# Patient Record
Sex: MALE | Race: OTHER | NOT HISPANIC OR LATINO | URBAN - METROPOLITAN AREA
[De-identification: names, ages, dates, MRNs, and addresses within clinical notes are randomized per-mention and may not be internally consistent; named-entity substitution may affect disease eponyms.]

---

## 2017-09-12 ENCOUNTER — INPATIENT (INPATIENT)
Facility: HOSPITAL | Age: 67
LOS: 30 days | Discharge: HOME | DRG: 683 | End: 2017-10-13
Attending: INTERNAL MEDICINE | Admitting: INTERNAL MEDICINE
Payer: SELF-PAY

## 2017-09-12 VITALS
DIASTOLIC BLOOD PRESSURE: 83 MMHG | OXYGEN SATURATION: 97 % | SYSTOLIC BLOOD PRESSURE: 133 MMHG | HEART RATE: 100 BPM | RESPIRATION RATE: 18 BRPM | TEMPERATURE: 98 F

## 2017-09-12 DIAGNOSIS — N17.9 ACUTE KIDNEY FAILURE, UNSPECIFIED: ICD-10-CM

## 2017-09-12 DIAGNOSIS — Z29.9 ENCOUNTER FOR PROPHYLACTIC MEASURES, UNSPECIFIED: ICD-10-CM

## 2017-09-12 DIAGNOSIS — R41.82 ALTERED MENTAL STATUS, UNSPECIFIED: ICD-10-CM

## 2017-09-12 LAB
ACETONE SERPL-MCNC: NEGATIVE — SIGNIFICANT CHANGE UP
ALBUMIN SERPL ELPH-MCNC: 3.8 G/DL — SIGNIFICANT CHANGE UP (ref 3.5–5)
ALP SERPL-CCNC: 93 U/L — SIGNIFICANT CHANGE UP (ref 40–120)
ALT FLD-CCNC: 35 U/L DA — SIGNIFICANT CHANGE UP (ref 10–60)
ANION GAP SERPL CALC-SCNC: 9 MMOL/L — SIGNIFICANT CHANGE UP (ref 5–17)
APPEARANCE UR: CLEAR — SIGNIFICANT CHANGE UP
APTT BLD: 32.1 SEC — SIGNIFICANT CHANGE UP (ref 27.5–37.4)
AST SERPL-CCNC: 22 U/L — SIGNIFICANT CHANGE UP (ref 10–40)
BACTERIA # UR AUTO: ABNORMAL /HPF
BASOPHILS # BLD AUTO: 0.1 K/UL — SIGNIFICANT CHANGE UP (ref 0–0.2)
BASOPHILS NFR BLD AUTO: 0.7 % — SIGNIFICANT CHANGE UP (ref 0–2)
BILIRUB SERPL-MCNC: 1.1 MG/DL — SIGNIFICANT CHANGE UP (ref 0.2–1.2)
BILIRUB UR-MCNC: NEGATIVE — SIGNIFICANT CHANGE UP
BUN SERPL-MCNC: 25 MG/DL — HIGH (ref 7–18)
CALCIUM SERPL-MCNC: 9.7 MG/DL — SIGNIFICANT CHANGE UP (ref 8.4–10.5)
CHLORIDE SERPL-SCNC: 108 MMOL/L — SIGNIFICANT CHANGE UP (ref 96–108)
CHLORIDE UR-SCNC: 36 MMOL/L — LOW (ref 55–125)
CK MB BLD-MCNC: 3.2 % — SIGNIFICANT CHANGE UP (ref 0–3.5)
CK MB CFR SERPL CALC: 4.9 NG/ML — HIGH (ref 0–3.6)
CK SERPL-CCNC: 151 U/L — SIGNIFICANT CHANGE UP (ref 35–232)
CO2 SERPL-SCNC: 27 MMOL/L — SIGNIFICANT CHANGE UP (ref 22–31)
COLOR SPEC: YELLOW — SIGNIFICANT CHANGE UP
COMMENT - URINE: SIGNIFICANT CHANGE UP
CREAT ?TM UR-MCNC: 80 MG/DL — SIGNIFICANT CHANGE UP
CREAT SERPL-MCNC: 1.54 MG/DL — HIGH (ref 0.5–1.3)
DIFF PNL FLD: NEGATIVE — SIGNIFICANT CHANGE UP
EOSINOPHIL # BLD AUTO: 0 K/UL — SIGNIFICANT CHANGE UP (ref 0–0.5)
EOSINOPHIL NFR BLD AUTO: 0.4 % — SIGNIFICANT CHANGE UP (ref 0–6)
EPI CELLS # UR: SIGNIFICANT CHANGE UP
ETHANOL SERPL-MCNC: <3 MG/DL — SIGNIFICANT CHANGE UP (ref 0–10)
GLUCOSE SERPL-MCNC: 107 MG/DL — HIGH (ref 70–99)
GLUCOSE UR QL: NEGATIVE — SIGNIFICANT CHANGE UP
HCT VFR BLD CALC: 45.1 % — SIGNIFICANT CHANGE UP (ref 39–50)
HGB BLD-MCNC: 15.1 G/DL — SIGNIFICANT CHANGE UP (ref 13–17)
INR BLD: 1.21 RATIO — HIGH (ref 0.88–1.16)
KETONES UR-MCNC: ABNORMAL
LACTATE SERPL-SCNC: 1.9 MMOL/L — SIGNIFICANT CHANGE UP (ref 0.7–2)
LEUKOCYTE ESTERASE UR-ACNC: ABNORMAL
LIDOCAIN IGE QN: 120 U/L — SIGNIFICANT CHANGE UP (ref 73–393)
LYMPHOCYTES # BLD AUTO: 16.6 % — SIGNIFICANT CHANGE UP (ref 13–44)
LYMPHOCYTES # BLD AUTO: 2.1 K/UL — SIGNIFICANT CHANGE UP (ref 1–3.3)
MAGNESIUM SERPL-MCNC: 2.3 MG/DL — SIGNIFICANT CHANGE UP (ref 1.6–2.6)
MCHC RBC-ENTMCNC: 30 PG — SIGNIFICANT CHANGE UP (ref 27–34)
MCHC RBC-ENTMCNC: 33.4 GM/DL — SIGNIFICANT CHANGE UP (ref 32–36)
MCV RBC AUTO: 89.7 FL — SIGNIFICANT CHANGE UP (ref 80–100)
MONOCYTES # BLD AUTO: 1 K/UL — HIGH (ref 0–0.9)
MONOCYTES NFR BLD AUTO: 7.9 % — SIGNIFICANT CHANGE UP (ref 2–14)
NEUTROPHILS # BLD AUTO: 9.6 K/UL — HIGH (ref 1.8–7.4)
NEUTROPHILS NFR BLD AUTO: 74.4 % — SIGNIFICANT CHANGE UP (ref 43–77)
NITRITE UR-MCNC: NEGATIVE — SIGNIFICANT CHANGE UP
OSMOLALITY UR: 403 MOS/KG — SIGNIFICANT CHANGE UP (ref 50–1200)
PCP SPEC-MCNC: SIGNIFICANT CHANGE UP
PH UR: 5 — SIGNIFICANT CHANGE UP (ref 5–8)
PLATELET # BLD AUTO: 267 K/UL — SIGNIFICANT CHANGE UP (ref 150–400)
POTASSIUM SERPL-MCNC: 4.2 MMOL/L — SIGNIFICANT CHANGE UP (ref 3.5–5.3)
POTASSIUM SERPL-SCNC: 4.2 MMOL/L — SIGNIFICANT CHANGE UP (ref 3.5–5.3)
POTASSIUM UR-SCNC: 15 MMOL/L — LOW (ref 25–125)
PROT ?TM UR-MCNC: 7 MG/DL — SIGNIFICANT CHANGE UP (ref 0–12)
PROT SERPL-MCNC: 8 G/DL — SIGNIFICANT CHANGE UP (ref 6–8.3)
PROT UR-MCNC: 30 MG/DL
PROTHROM AB SERPL-ACNC: 13.2 SEC — HIGH (ref 9.8–12.7)
RBC # BLD: 5.02 M/UL — SIGNIFICANT CHANGE UP (ref 4.2–5.8)
RBC # FLD: 12.7 % — SIGNIFICANT CHANGE UP (ref 10.3–14.5)
RBC CASTS # UR COMP ASSIST: SIGNIFICANT CHANGE UP /HPF (ref 0–2)
SODIUM SERPL-SCNC: 144 MMOL/L — SIGNIFICANT CHANGE UP (ref 135–145)
SODIUM UR-SCNC: 48 MMOL/L — SIGNIFICANT CHANGE UP (ref 40–220)
SP GR SPEC: 1.02 — SIGNIFICANT CHANGE UP (ref 1.01–1.02)
TROPONIN I SERPL-MCNC: 0.02 NG/ML — SIGNIFICANT CHANGE UP (ref 0–0.04)
UROBILINOGEN FLD QL: NEGATIVE — SIGNIFICANT CHANGE UP
WBC # BLD: 12.8 K/UL — HIGH (ref 3.8–10.5)
WBC # FLD AUTO: 12.8 K/UL — HIGH (ref 3.8–10.5)
WBC UR QL: ABNORMAL /HPF (ref 0–5)

## 2017-09-12 PROCEDURE — 99285 EMERGENCY DEPT VISIT HI MDM: CPT

## 2017-09-12 PROCEDURE — 70450 CT HEAD/BRAIN W/O DYE: CPT | Mod: 26

## 2017-09-12 PROCEDURE — 74176 CT ABD & PELVIS W/O CONTRAST: CPT | Mod: 26

## 2017-09-12 PROCEDURE — 71010: CPT | Mod: 26

## 2017-09-12 RX ORDER — SODIUM CHLORIDE 9 MG/ML
1000 INJECTION INTRAMUSCULAR; INTRAVENOUS; SUBCUTANEOUS
Qty: 0 | Refills: 0 | Status: DISCONTINUED | OUTPATIENT
Start: 2017-09-12 | End: 2017-09-13

## 2017-09-12 RX ORDER — CEFTRIAXONE 500 MG/1
INJECTION, POWDER, FOR SOLUTION INTRAMUSCULAR; INTRAVENOUS
Qty: 0 | Refills: 0 | Status: DISCONTINUED | OUTPATIENT
Start: 2017-09-12 | End: 2017-09-14

## 2017-09-12 RX ORDER — SODIUM CHLORIDE 9 MG/ML
1000 INJECTION INTRAMUSCULAR; INTRAVENOUS; SUBCUTANEOUS
Qty: 0 | Refills: 0 | Status: DISCONTINUED | OUTPATIENT
Start: 2017-09-12 | End: 2017-09-18

## 2017-09-12 RX ORDER — HEPARIN SODIUM 5000 [USP'U]/ML
5000 INJECTION INTRAVENOUS; SUBCUTANEOUS EVERY 8 HOURS
Qty: 0 | Refills: 0 | Status: DISCONTINUED | OUTPATIENT
Start: 2017-09-12 | End: 2017-10-06

## 2017-09-12 RX ORDER — CEFTRIAXONE 500 MG/1
1 INJECTION, POWDER, FOR SOLUTION INTRAMUSCULAR; INTRAVENOUS ONCE
Qty: 0 | Refills: 0 | Status: COMPLETED | OUTPATIENT
Start: 2017-09-12 | End: 2017-09-12

## 2017-09-12 RX ORDER — SODIUM CHLORIDE 9 MG/ML
3 INJECTION INTRAMUSCULAR; INTRAVENOUS; SUBCUTANEOUS ONCE
Qty: 0 | Refills: 0 | Status: COMPLETED | OUTPATIENT
Start: 2017-09-12 | End: 2017-09-12

## 2017-09-12 RX ORDER — CEFTRIAXONE 500 MG/1
1 INJECTION, POWDER, FOR SOLUTION INTRAMUSCULAR; INTRAVENOUS EVERY 24 HOURS
Qty: 0 | Refills: 0 | Status: DISCONTINUED | OUTPATIENT
Start: 2017-09-13 | End: 2017-09-14

## 2017-09-12 RX ADMIN — SODIUM CHLORIDE 3 MILLILITER(S): 9 INJECTION INTRAMUSCULAR; INTRAVENOUS; SUBCUTANEOUS at 15:17

## 2017-09-12 RX ADMIN — SODIUM CHLORIDE 125 MILLILITER(S): 9 INJECTION INTRAMUSCULAR; INTRAVENOUS; SUBCUTANEOUS at 22:21

## 2017-09-12 RX ADMIN — SODIUM CHLORIDE 125 MILLILITER(S): 9 INJECTION INTRAMUSCULAR; INTRAVENOUS; SUBCUTANEOUS at 17:26

## 2017-09-12 NOTE — ED PROVIDER NOTE - OBJECTIVE STATEMENT
HPI limited. Pt very confused, unable to answer questions even with . 65 y/o male with unknown PMHx BIB EMS to the ED c/o abdominal pain as per triage note. Pt knows his name, but nothing else. Pt is homeless and was found on the street by EMS. Per triage nurse, pt denies nausea, vomiting.

## 2017-09-12 NOTE — H&P ADULT - RS GEN PE MLT RESP DETAILS PC
normal/breath sounds equal/clear to auscultation bilaterally/good air movement/respirations non-labored/airway patent

## 2017-09-12 NOTE — H&P ADULT - NEUROLOGICAL DETAILS
cranial nerves intact/responds to verbal commands/responds to pain/sensation intact/deep reflexes intact

## 2017-09-12 NOTE — ED PROVIDER NOTE - MEDICAL DECISION MAKING DETAILS
Pt brought in by ambulance. Upon triage with c/o LLQ pain. However, pt denied any abdominal pain during examination, and was more concerned about his BP. No knowledge of pt's underlying mentation. Will need workup, head CT, labs, r/o infectious process vs. metabolic imbalance vs. neurological abnormality and possible admission.

## 2017-09-12 NOTE — H&P ADULT - ASSESSMENT
Patient is a 66/M with unknown PMHx, BIB EMS for complaint of LLQ pain    patient got a CT head in the ED showing a  shunt but no hydrocephalus, CT A/P showing nodular liver suspicious for cirrhosis, VQ shunt courses over LLQ, upto upper pelvis. Found to have a UA showing 6-10 WBC with positive Leuc esterase, , Lipase, Lactate and CK WNL.. Has leucocytosis on WBC, 12.8, without left shift, VICENTE, BUN:Creat of 25:1.54  Admitted fro acute encephalopathy, under investigation

## 2017-09-12 NOTE — H&P ADULT - HISTORY OF PRESENT ILLNESS
Patient is a 66/M with unknown PMHx, BIB EMS for complaint of LLQ pain, however Patient is very confused, unable to answer questions even with . Used  services: [id number], Mr Cordoba. Majority of Hx obtained from ED provider note. patient is alert but not ortiented to time/place and person.Pt knows his name, but nothing else. Apparently Patient is homeless and was found on the street by EMS. Per triage nurse, pt denies nausea, vomiting.However, pt denied any abdominal pain during examination, and was more concerned about his BP. No knowledge of pt's underlying mentation. Patient's states that  he live with his brother in law in the [place], has h/o hemorrhagic stroke in the past, no h/o seizure, does not know his brother in law's last name, only 1st name, Ivan. does not know the number.   patient got a CT head in the ED showing a  shunt but no hydrocephalus, CT A/P showing nodular liver suspicious for cirrhosis, VQ shunt courses over LLQ, upto upper pelvis. Found to have a UA showing 6-10 WBC with positive Leuc esterase, , Lipase, Lactate and CK WNL.. Has leucocytosis on WBC, 12.8, without left shift, VICENTE, BUN:Creat of 25:1.54  Admitted fro acute encephalopathy, under investigation Patient is a 66/M with unknown PMHx, BIB EMS for complaint of LLQ pain, however Patient is very confused, unable to answer questions even with . Used  services: [id number], Mr Cordoba. Majority of Hx obtained from ED provider note. patient is alert but not ortiented to time/place and person.Pt knows his name, but nothing else. Apparently Patient is homeless and was found on the street by EMS. Per triage nurse, pt denies nausea, vomiting.However, pt denied any abdominal pain during examination, and was more concerned about his BP. No knowledge of pt's underlying mentation. Patient's states that  he live with his brother in law in the [place], has h/o hemorrhagic stroke in the past, no h/o seizure, does not know his brother in law's last name, only 1st name, Ivan. does not know the number.

## 2017-09-12 NOTE — H&P ADULT - PROBLEM SELECTOR PLAN 2
VICENTE, BUN:Creat of 25:1.54, ratio: 16.23  Unknown baseline  Will give IV hydration, send urine lytes  f/up BMP in AM

## 2017-09-12 NOTE — H&P ADULT - PROBLEM SELECTOR PLAN 1
Patient is awake and alert but confused, unable to provide any info except his name  unable to obtain the name of his PCP, pharmacy, any PMHx, unknown indiaction of the  shunt  has leucocytosis with positive UA, cultures testing, will start IVF and start ceftriaxone ans AMS could be 2/2 to underlying infection  qSOFA:1, Cultures testing, Lactate WNL  Will get Social Work consult to obtain more info  Primary team to please obtain records for the  Shunt  Patient currently does not have LLQ pain, CT A/P shows  shunt but no acute pathologies. Nodular liver, LFTs WNL  Will send hepatitis panel  Check Utox and Blood alcohol level Patient is awake and alert but confused, unable to provide any info except his name  unable to obtain the name of his PCP, pharmacy, any PMHx, unknown indication of the  shunt  has leucocytosis with positive UA, cultures testing, will start IVF and start ceftriaxone ans AMS could be 2/2 to underlying infection  qSOFA:1, Cultures testing, Lactate WNL  Will get Social Work consult to obtain more info  Primary team to please obtain records for the  Shunt, any PMHx, medication list and complete Med rec  Patient currently does not have LLQ pain, CT A/P shows  shunt but no acute pathologies. Nodular liver, LFTs WNL  Will send hepatitis panel  Check Utox and Blood alcohol level

## 2017-09-12 NOTE — ED PROVIDER NOTE - PROGRESS NOTE DETAILS
CT head & A/P -neg, pt now knows his name, claims he live with his brother in law in the [place], has h/o hemorrhagic stroke in the past, no h/o seizure, does not know his brother in law's last name, only 1st name, Ivan. does not know the number.  Since pt is confused, not sure if earlier episode was a seizure, d/w Dr. Martin, will admit

## 2017-09-12 NOTE — ED PROVIDER NOTE - UNABLE TO OBTAIN
HPI limited. Pt very confused, unable to answer questions even with . Dementia ROS limited. Pt is very confused. Unable to answer questions even with a .

## 2017-09-12 NOTE — ED ADULT NURSE REASSESSMENT NOTE - NS ED NURSE REASSESS COMMENT FT1
patient received lying supine in bed alert and oriented x3 breathing spontaneously on room .IV infusion in progress flowing at prescribed rate. Reported no complaints of pain or discomfort. Vitals charted.

## 2017-09-13 DIAGNOSIS — N39.0 URINARY TRACT INFECTION, SITE NOT SPECIFIED: ICD-10-CM

## 2017-09-13 LAB
24R-OH-CALCIDIOL SERPL-MCNC: 17.3 NG/ML — LOW (ref 30–100)
ALBUMIN SERPL ELPH-MCNC: 3.1 G/DL — LOW (ref 3.5–5)
ALP SERPL-CCNC: 76 U/L — SIGNIFICANT CHANGE UP (ref 40–120)
ALT FLD-CCNC: 26 U/L DA — SIGNIFICANT CHANGE UP (ref 10–60)
ANION GAP SERPL CALC-SCNC: 7 MMOL/L — SIGNIFICANT CHANGE UP (ref 5–17)
AST SERPL-CCNC: 21 U/L — SIGNIFICANT CHANGE UP (ref 10–40)
BASOPHILS # BLD AUTO: 0.1 K/UL — SIGNIFICANT CHANGE UP (ref 0–0.2)
BASOPHILS NFR BLD AUTO: 1.1 % — SIGNIFICANT CHANGE UP (ref 0–2)
BILIRUB SERPL-MCNC: 0.9 MG/DL — SIGNIFICANT CHANGE UP (ref 0.2–1.2)
BUN SERPL-MCNC: 15 MG/DL — SIGNIFICANT CHANGE UP (ref 7–18)
CALCIUM SERPL-MCNC: 8.7 MG/DL — SIGNIFICANT CHANGE UP (ref 8.4–10.5)
CHLORIDE SERPL-SCNC: 114 MMOL/L — HIGH (ref 96–108)
CHOLEST SERPL-MCNC: 102 MG/DL — SIGNIFICANT CHANGE UP (ref 10–199)
CO2 SERPL-SCNC: 26 MMOL/L — SIGNIFICANT CHANGE UP (ref 22–31)
CREAT SERPL-MCNC: 0.9 MG/DL — SIGNIFICANT CHANGE UP (ref 0.5–1.3)
EOSINOPHIL # BLD AUTO: 0.3 K/UL — SIGNIFICANT CHANGE UP (ref 0–0.5)
EOSINOPHIL NFR BLD AUTO: 3.1 % — SIGNIFICANT CHANGE UP (ref 0–6)
ETHANOL SERPL-MCNC: <3 MG/DL — SIGNIFICANT CHANGE UP (ref 0–10)
ETHANOL SERPL-MCNC: <3 MG/DL — SIGNIFICANT CHANGE UP (ref 0–10)
FOLATE SERPL-MCNC: 7.5 NG/ML — SIGNIFICANT CHANGE UP (ref 4.8–24.2)
GLUCOSE SERPL-MCNC: 90 MG/DL — SIGNIFICANT CHANGE UP (ref 70–99)
HAV IGM SER-ACNC: SIGNIFICANT CHANGE UP
HBA1C BLD-MCNC: 6.1 % — HIGH (ref 4–5.6)
HBV CORE IGM SER-ACNC: SIGNIFICANT CHANGE UP
HBV SURFACE AG SER-ACNC: SIGNIFICANT CHANGE UP
HCT VFR BLD CALC: 40.1 % — SIGNIFICANT CHANGE UP (ref 39–50)
HCV AB S/CO SERPL IA: 0.11 S/CO — SIGNIFICANT CHANGE UP
HCV AB SERPL-IMP: SIGNIFICANT CHANGE UP
HDLC SERPL-MCNC: 31 MG/DL — LOW (ref 40–125)
HGB BLD-MCNC: 13.6 G/DL — SIGNIFICANT CHANGE UP (ref 13–17)
HIV 1+2 AB+HIV1 P24 AG SERPL QL IA: SIGNIFICANT CHANGE UP
LIPID PNL WITH DIRECT LDL SERPL: 51 MG/DL — SIGNIFICANT CHANGE UP
LYMPHOCYTES # BLD AUTO: 2.5 K/UL — SIGNIFICANT CHANGE UP (ref 1–3.3)
LYMPHOCYTES # BLD AUTO: 24.9 % — SIGNIFICANT CHANGE UP (ref 13–44)
MAGNESIUM SERPL-MCNC: 2.3 MG/DL — SIGNIFICANT CHANGE UP (ref 1.6–2.6)
MCHC RBC-ENTMCNC: 29.9 PG — SIGNIFICANT CHANGE UP (ref 27–34)
MCHC RBC-ENTMCNC: 33.8 GM/DL — SIGNIFICANT CHANGE UP (ref 32–36)
MCV RBC AUTO: 88.3 FL — SIGNIFICANT CHANGE UP (ref 80–100)
MONOCYTES # BLD AUTO: 1.4 K/UL — HIGH (ref 0–0.9)
MONOCYTES NFR BLD AUTO: 13.6 % — SIGNIFICANT CHANGE UP (ref 2–14)
NEUTROPHILS # BLD AUTO: 5.8 K/UL — SIGNIFICANT CHANGE UP (ref 1.8–7.4)
NEUTROPHILS NFR BLD AUTO: 57.4 % — SIGNIFICANT CHANGE UP (ref 43–77)
PHOSPHATE SERPL-MCNC: 3 MG/DL — SIGNIFICANT CHANGE UP (ref 2.5–4.5)
PLATELET # BLD AUTO: 202 K/UL — SIGNIFICANT CHANGE UP (ref 150–400)
POTASSIUM SERPL-MCNC: 3.9 MMOL/L — SIGNIFICANT CHANGE UP (ref 3.5–5.3)
POTASSIUM SERPL-SCNC: 3.9 MMOL/L — SIGNIFICANT CHANGE UP (ref 3.5–5.3)
PROT SERPL-MCNC: 6.7 G/DL — SIGNIFICANT CHANGE UP (ref 6–8.3)
RBC # BLD: 4.55 M/UL — SIGNIFICANT CHANGE UP (ref 4.2–5.8)
RBC # FLD: 12.7 % — SIGNIFICANT CHANGE UP (ref 10.3–14.5)
SODIUM SERPL-SCNC: 147 MMOL/L — HIGH (ref 135–145)
TOTAL CHOLESTEROL/HDL RATIO MEASUREMENT: 3.3 RATIO — LOW (ref 3.4–9.6)
TRIGL SERPL-MCNC: 98 MG/DL — SIGNIFICANT CHANGE UP (ref 10–149)
TSH SERPL-MCNC: 1 UU/ML — SIGNIFICANT CHANGE UP (ref 0.34–4.82)
VIT B12 SERPL-MCNC: 403 PG/ML — SIGNIFICANT CHANGE UP (ref 243–894)
WBC # BLD: 10.1 K/UL — SIGNIFICANT CHANGE UP (ref 3.8–10.5)
WBC # FLD AUTO: 10.1 K/UL — SIGNIFICANT CHANGE UP (ref 3.8–10.5)

## 2017-09-13 RX ADMIN — SODIUM CHLORIDE 125 MILLILITER(S): 9 INJECTION INTRAMUSCULAR; INTRAVENOUS; SUBCUTANEOUS at 05:04

## 2017-09-13 RX ADMIN — HEPARIN SODIUM 5000 UNIT(S): 5000 INJECTION INTRAVENOUS; SUBCUTANEOUS at 16:07

## 2017-09-13 RX ADMIN — HEPARIN SODIUM 5000 UNIT(S): 5000 INJECTION INTRAVENOUS; SUBCUTANEOUS at 22:08

## 2017-09-13 RX ADMIN — CEFTRIAXONE 100 GRAM(S): 500 INJECTION, POWDER, FOR SOLUTION INTRAMUSCULAR; INTRAVENOUS at 22:05

## 2017-09-13 RX ADMIN — CEFTRIAXONE 100 GRAM(S): 500 INJECTION, POWDER, FOR SOLUTION INTRAMUSCULAR; INTRAVENOUS at 00:20

## 2017-09-13 RX ADMIN — HEPARIN SODIUM 5000 UNIT(S): 5000 INJECTION INTRAVENOUS; SUBCUTANEOUS at 05:03

## 2017-09-13 NOTE — PROGRESS NOTE ADULT - PROBLEM SELECTOR PLAN 2
AAOx2, otherwise mental status unreliable and inconsistent, CT negative for stroke  - SW consulted; possible plan for shelter vs long term placement  - Negative urine toxicology and blood alcohol levels  ***F/u PT recommendation  Patient is awake and alert but confused, unable to provide any info except his name  unable to obtain the name of his PCP, pharmacy, any PMHx, unknown indication of the  shunt  has leucocytosis with positive UA, cultures testing, will start IVF and start ceftriaxone ans AMS could be 2/2 to underlying infection  qSOFA:1, Cultures testing, Lactate WNL  Will get Social Work consult to obtain more info  Primary team to please obtain records for the  Shunt, any PMHx, medication list and complete Med rec  Patient currently does not have LLQ pain, CT A/P shows  shunt but no acute pathologies. Nodular liver, LFTs WNL  Will send hepatitis panel  Check Utox and Blood alcohol level AAOx2, otherwise mental status unreliable and inconsistent, CT negative for stroke  - SW consulted; possible plan for shelter vs long term placement  - Negative urine toxicology and blood alcohol levels  - Negative Hepatitis panel  ***F/u PT recommendation

## 2017-09-13 NOTE — PROGRESS NOTE ADULT - SUBJECTIVE AND OBJECTIVE BOX
PGY 1 Note discussed with supervising resident and primary attending    Patient is a 66y old  Male who presents with a chief complaint of LLQ pain (12 Sep 2017 22:05)      INTERVAL HPI/OVERNIGHT EVENTS: Patient seen and examined at bedside with no new complaints    MEDICATIONS  (STANDING):  sodium chloride 0.9%. 1000 milliLiter(s) (125 mL/Hr) IV Continuous <Continuous>  heparin  Injectable 5000 Unit(s) SubCutaneous every 8 hours  cefTRIAXone   IVPB      cefTRIAXone   IVPB 1 Gram(s) IV Intermittent every 24 hours    MEDICATIONS  (PRN):      __________________________________________________  REVIEW OF SYSTEMS:    CONSTITUTIONAL: No fever,   EYES: No acute visual disturbances  NECK: No pain or stiffness  RESPIRATORY: No cough; No shortness of breath  CARDIOVASCULAR: No chest pain, no palpitations  GASTROINTESTINAL: No pain. No nausea or vomiting; No diarrhea   NEUROLOGICAL: No headache or numbness, no tremors  MUSCULOSKELETAL: No joint pain, no muscle pain  GENITOURINARY: No dysuria, no frequency, no hesitancy  PSYCHIATRY: No depression , no anxiety  ALL OTHER  ROS negative        Vital Signs Last 24 Hrs  T(C): 37.1 (13 Sep 2017 14:22), Max: 37.4 (13 Sep 2017 01:35)  T(F): 98.7 (13 Sep 2017 14:22), Max: 99.4 (13 Sep 2017 01:35)  HR: 101 (13 Sep 2017 14:22) (65 - 101)  BP: 99/68 (13 Sep 2017 14:22) (99/68 - 143/54)  BP(mean): --  RR: 16 (13 Sep 2017 14:22) (16 - 18)  SpO2: 98% (13 Sep 2017 14:22) (95% - 100%)    ________________________________________________  PHYSICAL EXAM:  GENERAL: NAD  HEENT: Normocephalic;  conjunctivae and sclerae clear; moist mucous membranes;   NECK : supple  CHEST/LUNG: Clear to auscultation bilaterally with good air entry   HEART: S1 S2  regular; no murmurs, gallops or rubs  ABDOMEN: Soft, Nontender, Nondistended; Bowel sounds present  EXTREMITIES: No cyanosis; no edema; no calf tenderness  NERVOUS SYSTEM:  Awake and alert; Oriented  to place, person and time ; no new deficits    _________________________________________________  LABS:                        13.6   10.1  )-----------( 202      ( 13 Sep 2017 05:56 )             40.1     09-13    147<H>  |  114<H>  |  15  ----------------------------<  90  3.9   |  26  |  0.90    Ca    8.7      13 Sep 2017 05:56  Phos  3.0     -  Mg     2.3     -    TPro  6.7  /  Alb  3.1<L>  /  TBili  0.9  /  DBili  0.2  /  AST  21  /  ALT  26  /  AlkPhos  76  09-13    PT/INR - ( 12 Sep 2017 15:17 )   PT: 13.2 sec;   INR: 1.21 ratio         PTT - ( 12 Sep 2017 15:17 )  PTT:32.1 sec  Urinalysis Basic - ( 12 Sep 2017 19:25 )    Color: Yellow / Appearance: Clear / S.020 / pH: x  Gluc: x / Ketone: Small  / Bili: Negative / Urobili: Negative   Blood: x / Protein: 30 mg/dL / Nitrite: Negative   Leuk Esterase: Moderate / RBC: 0-2 /HPF / WBC 6-10 /HPF   Sq Epi: x / Non Sq Epi: Few / Bacteria: Few /HPF      CAPILLARY BLOOD GLUCOSE            RADIOLOGY & ADDITIONAL TESTS:    Imaging Personally Reviewed:  YES    Consultant(s) Notes Reviewed:   YES    Care Discussed with Consultants : YES    Plan of care was discussed with patient and /or primary care giver; all questions and concerns were addressed and care was aligned with patient's wishes.

## 2017-09-13 NOTE — PROGRESS NOTE ADULT - PROBLEM SELECTOR PLAN 1
UA w/ +ve LE and 6-10 WBC, no leukocytosis of fever overnight  - C/w Rocephin 1 gram  - UCx 9/12; E. faecalis, less than 50K  ID Dr. Fitzgerald

## 2017-09-14 LAB
-  AMIKACIN: SIGNIFICANT CHANGE UP
-  AMPICILLIN/SULBACTAM: SIGNIFICANT CHANGE UP
-  AMPICILLIN: SIGNIFICANT CHANGE UP
-  AMPICILLIN: SIGNIFICANT CHANGE UP
-  AZTREONAM: SIGNIFICANT CHANGE UP
-  CEFAZOLIN: SIGNIFICANT CHANGE UP
-  CEFEPIME: SIGNIFICANT CHANGE UP
-  CEFOXITIN: SIGNIFICANT CHANGE UP
-  CEFTAZIDIME: SIGNIFICANT CHANGE UP
-  CEFTRIAXONE: SIGNIFICANT CHANGE UP
-  CIPROFLOXACIN: SIGNIFICANT CHANGE UP
-  CIPROFLOXACIN: SIGNIFICANT CHANGE UP
-  ERTAPENEM: SIGNIFICANT CHANGE UP
-  GENTAMICIN: SIGNIFICANT CHANGE UP
-  IMIPENEM: SIGNIFICANT CHANGE UP
-  LEVOFLOXACIN: SIGNIFICANT CHANGE UP
-  MEROPENEM: SIGNIFICANT CHANGE UP
-  NITROFURANTOIN: SIGNIFICANT CHANGE UP
-  NITROFURANTOIN: SIGNIFICANT CHANGE UP
-  PIPERACILLIN/TAZOBACTAM: SIGNIFICANT CHANGE UP
-  TETRACYCLINE: SIGNIFICANT CHANGE UP
-  TOBRAMYCIN: SIGNIFICANT CHANGE UP
-  TRIMETHOPRIM/SULFAMETHOXAZOLE: SIGNIFICANT CHANGE UP
-  VANCOMYCIN: SIGNIFICANT CHANGE UP
ANION GAP SERPL CALC-SCNC: 6 MMOL/L — SIGNIFICANT CHANGE UP (ref 5–17)
BUN SERPL-MCNC: 10 MG/DL — SIGNIFICANT CHANGE UP (ref 7–18)
CALCIUM SERPL-MCNC: 8.4 MG/DL — SIGNIFICANT CHANGE UP (ref 8.4–10.5)
CHLORIDE SERPL-SCNC: 112 MMOL/L — HIGH (ref 96–108)
CO2 SERPL-SCNC: 27 MMOL/L — SIGNIFICANT CHANGE UP (ref 22–31)
CREAT SERPL-MCNC: 0.82 MG/DL — SIGNIFICANT CHANGE UP (ref 0.5–1.3)
CULTURE RESULTS: SIGNIFICANT CHANGE UP
GLUCOSE SERPL-MCNC: 89 MG/DL — SIGNIFICANT CHANGE UP (ref 70–99)
HCT VFR BLD CALC: 36.3 % — LOW (ref 39–50)
HGB BLD-MCNC: 12.2 G/DL — LOW (ref 13–17)
MCHC RBC-ENTMCNC: 29.9 PG — SIGNIFICANT CHANGE UP (ref 27–34)
MCHC RBC-ENTMCNC: 33.7 GM/DL — SIGNIFICANT CHANGE UP (ref 32–36)
MCV RBC AUTO: 88.7 FL — SIGNIFICANT CHANGE UP (ref 80–100)
METHOD TYPE: SIGNIFICANT CHANGE UP
METHOD TYPE: SIGNIFICANT CHANGE UP
ORGANISM # SPEC MICROSCOPIC CNT: SIGNIFICANT CHANGE UP
PLATELET # BLD AUTO: 186 K/UL — SIGNIFICANT CHANGE UP (ref 150–400)
POTASSIUM SERPL-MCNC: 3.5 MMOL/L — SIGNIFICANT CHANGE UP (ref 3.5–5.3)
POTASSIUM SERPL-SCNC: 3.5 MMOL/L — SIGNIFICANT CHANGE UP (ref 3.5–5.3)
RBC # BLD: 4.1 M/UL — LOW (ref 4.2–5.8)
RBC # FLD: 12.2 % — SIGNIFICANT CHANGE UP (ref 10.3–14.5)
SODIUM SERPL-SCNC: 145 MMOL/L — SIGNIFICANT CHANGE UP (ref 135–145)
SPECIMEN SOURCE: SIGNIFICANT CHANGE UP
WBC # BLD: 6.4 K/UL — SIGNIFICANT CHANGE UP (ref 3.8–10.5)
WBC # FLD AUTO: 6.4 K/UL — SIGNIFICANT CHANGE UP (ref 3.8–10.5)

## 2017-09-14 RX ORDER — ERGOCALCIFEROL 1.25 MG/1
50000 CAPSULE ORAL
Qty: 0 | Refills: 0 | Status: DISCONTINUED | OUTPATIENT
Start: 2017-09-14 | End: 2017-10-13

## 2017-09-14 RX ADMIN — HEPARIN SODIUM 5000 UNIT(S): 5000 INJECTION INTRAVENOUS; SUBCUTANEOUS at 14:35

## 2017-09-14 RX ADMIN — HEPARIN SODIUM 5000 UNIT(S): 5000 INJECTION INTRAVENOUS; SUBCUTANEOUS at 22:30

## 2017-09-14 RX ADMIN — SODIUM CHLORIDE 125 MILLILITER(S): 9 INJECTION INTRAMUSCULAR; INTRAVENOUS; SUBCUTANEOUS at 12:49

## 2017-09-14 RX ADMIN — HEPARIN SODIUM 5000 UNIT(S): 5000 INJECTION INTRAVENOUS; SUBCUTANEOUS at 05:20

## 2017-09-14 RX ADMIN — ERGOCALCIFEROL 50000 UNIT(S): 1.25 CAPSULE ORAL at 12:47

## 2017-09-14 NOTE — PROGRESS NOTE ADULT - PROBLEM SELECTOR PLAN 1
AAOx2, otherwise mental status unreliable and inconsistent, CT negative for stroke  - SW consulted; possible plan for shelter vs long term placement  - Negative urine toxicology and blood alcohol levels  - Negative Hepatitis panel  ***F/u PT recommendation; patient ambulatory w/ minimal assistance  ***F/u  for placement

## 2017-09-14 NOTE — CONSULT NOTE ADULT - SUBJECTIVE AND OBJECTIVE BOX
HPI:  Patient is a 66/M with unknown PMHx, BIB EMS for complaint of LLQ pain, however Patient is very confused, unable to answer questions even with . Used  services: [id number], Mr Cordoba. Majority of Hx obtained from ED provider note. patient is alert but not ortiented to time/place and person.Pt knows his name, but nothing else. Apparently Patient is homeless and was found on the street by EMS. Per triage nurse, pt denies nausea, vomiting.However, pt denied any abdominal pain during examination, and was more concerned about his BP. No knowledge of pt's underlying mentation. Patient's states that  he live with his brother in law in the [place], has h/o hemorrhagic stroke in the past, no h/o seizure, does not know his brother in law's last name, only 1st name, Ivan. does not know the number. (12 Sep 2017 22:05)      PAST MEDICAL & SURGICAL HISTORY:      No Known Allergies      Meds:  sodium chloride 0.9%. 1000 milliLiter(s) IV Continuous <Continuous>  heparin  Injectable 5000 Unit(s) SubCutaneous every 8 hours  cefTRIAXone   IVPB      cefTRIAXone   IVPB 1 Gram(s) IV Intermittent every 24 hours  ergocalciferol 92915 Unit(s) Oral <User Schedule>      SOCIAL HISTORY:  Smoker:  YES / NO        PACK YEARS:                         WHEN QUIT?  ETOH use:  YES / NO               FREQUENCY / QUANTITY:  Ilicit Drug use:  YES / NO  Occupation:  Assisted device use (Cane / Walker):  Live with:    FAMILY HISTORY:      VITALS:  Vital Signs Last 24 Hrs  T(C): 36.6 (14 Sep 2017 05:14), Max: 37.2 (13 Sep 2017 21:18)  T(F): 97.9 (14 Sep 2017 05:14), Max: 99 (13 Sep 2017 21:18)  HR: 71 (14 Sep 2017 05:14) (71 - 101)  BP: 138/74 (14 Sep 2017 05:14) (99/50 - 138/74)  BP(mean): --  RR: 18 (14 Sep 2017 05:14) (16 - 18)  SpO2: 96% (14 Sep 2017 05:14) (96% - 98%)    LABS/DIAGNOSTIC TESTS:                          12.2   6.4   )-----------( 186      ( 14 Sep 2017 07:34 )             36.3     WBC Count: 6.4 K/uL ( @ 07:34)  WBC Count: 10.1 K/uL ( @ 05:56)  WBC Count: 12.8 K/uL ( @ 15:17)          145  |  112<H>  |  10  ----------------------------<  89  3.5   |  27  |  0.82    Ca    8.4      14 Sep 2017 07:34  Phos  3.0       Mg     2.3         TPro  6.7  /  Alb  3.1<L>  /  TBili  0.9  /  DBili  0.2  /  AST  21  /  ALT  26  /  AlkPhos  76        Urinalysis Basic - ( 12 Sep 2017 19:25 )    Color: Yellow / Appearance: Clear / S.020 / pH: x  Gluc: x / Ketone: Small  / Bili: Negative / Urobili: Negative   Blood: x / Protein: 30 mg/dL / Nitrite: Negative   Leuk Esterase: Moderate / RBC: 0-2 /HPF / WBC 6-10 /HPF   Sq Epi: x / Non Sq Epi: Few / Bacteria: Few /HPF        LIVER FUNCTIONS - ( 13 Sep 2017 05:56 )  Alb: 3.1 g/dL / Pro: 6.7 g/dL / ALK PHOS: 76 U/L / ALT: 26 U/L DA / AST: 21 U/L / GGT: x             PT/INR - ( 12 Sep 2017 15:17 )   PT: 13.2 sec;   INR: 1.21 ratio         PTT - ( 12 Sep 2017 15:17 )  PTT:32.1 sec    LACTATE:    ABG -     CULTURES:       RADIOLOGY:      ROS  [  ] UNABLE TO ELICIT HPI:  Patient is a 66/M with unknown PMHx, BIB EMS for complaint of LLQ pain, however Patient is very confused, unable to answer questions even with . Used  services: [id number], Mr Cordoba. Majority of Hx obtained from ED provider note. patient is alert but not ortiented to time/place and person.Pt knows his name, but nothing else. Apparently Patient is homeless and was found on the street by EMS. Per triage nurse, pt denies nausea, vomiting.However, pt denied any abdominal pain during examination, and was more concerned about his BP. No knowledge of pt's underlying mentation. Patient's states that  he live with his brother in law in the [place], has h/o hemorrhagic stroke in the past, no h/o seizure, does not know his brother in law's last name, only 1st name, Ivan. does not know the number. (12 Sep 2017 22:05)      Brief Hospital course:      No Known Allergies      Meds:  sodium chloride 0.9%. 1000 milliLiter(s) IV Continuous <Continuous>  heparin  Injectable 5000 Unit(s) SubCutaneous every 8 hours  cefTRIAXone   IVPB      cefTRIAXone   IVPB 1 Gram(s) IV Intermittent every 24 hours  ergocalciferol 10044 Unit(s) Oral <User Schedule>      SOCIAL HISTORY:  Smoker:  YES / NO        PACK YEARS:                         WHEN QUIT?  ETOH use:  YES / NO               FREQUENCY / QUANTITY:  Ilicit Drug use:  YES / NO  Occupation:  Assisted device use (Cane / Walker):  Live with:    FAMILY HISTORY:      VITALS:  Vital Signs Last 24 Hrs  T(C): 36.6 (14 Sep 2017 05:14), Max: 37.2 (13 Sep 2017 21:18)  T(F): 97.9 (14 Sep 2017 05:14), Max: 99 (13 Sep 2017 21:18)  HR: 71 (14 Sep 2017 05:14) (71 - 101)  BP: 138/74 (14 Sep 2017 05:14) (99/50 - 138/74)  BP(mean): --  RR: 18 (14 Sep 2017 05:14) (16 - 18)  SpO2: 96% (14 Sep 2017 05:14) (96% - 98%)    LABS/DIAGNOSTIC TESTS:                          12.2   6.4   )-----------( 186      ( 14 Sep 2017 07:34 )             36.3     WBC Count: 6.4 K/uL ( @ 07:34)  WBC Count: 10.1 K/uL ( @ 05:56)  WBC Count: 12.8 K/uL ( @ 15:17)          145  |  112<H>  |  10  ----------------------------<  89  3.5   |  27  |  0.82    Ca    8.4      14 Sep 2017 07:34  Phos  3.0       Mg     2.3         TPro  6.7  /  Alb  3.1<L>  /  TBili  0.9  /  DBili  0.2  /  AST  21  /  ALT  26  /  AlkPhos  76        Urinalysis Basic - ( 12 Sep 2017 19:25 )    Color: Yellow / Appearance: Clear / S.020 / pH: x  Gluc: x / Ketone: Small  / Bili: Negative / Urobili: Negative   Blood: x / Protein: 30 mg/dL / Nitrite: Negative   Leuk Esterase: Moderate / RBC: 0-2 /HPF / WBC 6-10 /HPF   Sq Epi: x / Non Sq Epi: Few / Bacteria: Few /HPF        LIVER FUNCTIONS - ( 13 Sep 2017 05:56 )  Alb: 3.1 g/dL / Pro: 6.7 g/dL / ALK PHOS: 76 U/L / ALT: 26 U/L DA / AST: 21 U/L / GGT: x             PT/INR - ( 12 Sep 2017 15:17 )   PT: 13.2 sec;   INR: 1.21 ratio         PTT - ( 12 Sep 2017 15:17 )  PTT:32.1 sec    LACTATE:    ABG -     CULTURES:       RADIOLOGY:      ROS  [  ] UNABLE TO ELICIT HPI:  Patient is a 66/M with unknown PMHx, BIB EMS for complaint of LLQ pain, however Patient is very confused, unable to answer questions even with . Used  services: [id number], Mr Cordoba. Majority of Hx obtained from ED provider note. patient is alert but not ortiented to time/place and person.Pt knows his name, but nothing else. Apparently Patient is homeless and was found on the street by EMS. Per triage nurse, pt denies nausea, vomiting.However, pt denied any abdominal pain during examination, and was more concerned about his BP. No knowledge of pt's underlying mentation. Patient's states that  he live with his brother in law in the [place], has h/o hemorrhagic stroke in the past, no h/o seizure, does not know his brother in law's last name, only 1st name, Ivan. does not know the number. (12 Sep 2017 22:05)      Brief Hospital course:      No Known Allergies      Meds:  sodium chloride 0.9%. 1000 milliLiter(s) IV Continuous <Continuous>  heparin  Injectable 5000 Unit(s) SubCutaneous every 8 hours  cefTRIAXone   IVPB      cefTRIAXone   IVPB 1 Gram(s) IV Intermittent every 24 hours  ergocalciferol 33992 Unit(s) Oral <User Schedule>      SOCIAL HISTORY:  Smoker:  YES / NO        PACK YEARS:                         WHEN QUIT?  ETOH use:  YES / NO               FREQUENCY / QUANTITY:  Ilicit Drug use:  YES / NO  Occupation:  Assisted device use (Cane / Walker):  Live with:    FAMILY HISTORY:      VITALS:  Vital Signs Last 24 Hrs  T(C): 36.6 (14 Sep 2017 05:14), Max: 37.2 (13 Sep 2017 21:18)  T(F): 97.9 (14 Sep 2017 05:14), Max: 99 (13 Sep 2017 21:18)  HR: 71 (14 Sep 2017 05:14) (71 - 101)  BP: 138/74 (14 Sep 2017 05:14) (99/50 - 138/74)  BP(mean): --  RR: 18 (14 Sep 2017 05:14) (16 - 18)  SpO2: 96% (14 Sep 2017 05:14) (96% - 98%)    LABS/DIAGNOSTIC TESTS:                          12.2   6.4   )-----------( 186      ( 14 Sep 2017 07:34 )             36.3     WBC Count: 6.4 K/uL ( @ 07:34)  WBC Count: 10.1 K/uL ( @ 05:56)  WBC Count: 12.8 K/uL ( @ 15:17)          145  |  112<H>  |  10  ----------------------------<  89  3.5   |  27  |  0.82    Ca    8.4      14 Sep 2017 07:34  Phos  3.0       Mg     2.3         TPro  6.7  /  Alb  3.1<L>  /  TBili  0.9  /  DBili  0.2  /  AST  21  /  ALT  26  /  AlkPhos  76        Urinalysis Basic - ( 12 Sep 2017 19:25 )    Color: Yellow / Appearance: Clear / S.020 / pH: x  Gluc: x / Ketone: Small  / Bili: Negative / Urobili: Negative   Blood: x / Protein: 30 mg/dL / Nitrite: Negative   Leuk Esterase: Moderate / RBC: 0-2 /HPF / WBC 6-10 /HPF   Sq Epi: x / Non Sq Epi: Few / Bacteria: Few /HPF        LIVER FUNCTIONS - ( 13 Sep 2017 05:56 )  Alb: 3.1 g/dL / Pro: 6.7 g/dL / ALK PHOS: 76 U/L / ALT: 26 U/L DA / AST: 21 U/L / GGT: x             PT/INR - ( 12 Sep 2017 15:17 )   PT: 13.2 sec;   INR: 1.21 ratio         PTT - ( 12 Sep 2017 15:17 )  PTT:32.1 sec    LACTATE:    ABG -     CULTURES:       RADIOLOGY:  < from: Xray Chest 1 View AP/PA (17 @ 18:04) >    EXAM:  CHEST SINGLE AP OR PA                            PROCEDURE DATE:  2017          INTERPRETATION:  Views:1  Comparison: Unavailable at this time  History: Left lower quadrant pain    The lungs are clear of infiltrates and effusions.     The cardiac and   mediastinal contours appear unremarkable.  shunt tubing is noted.    Impression:  1. No evidence of acute pulmonary disease.        < from: CT Abdomen and Pelvis w/ Oral Cont (17 @ 16:48) >    EXAM:  CT ABDOMEN AND PELVIS OC                            PROCEDURE DATE:  2017          INTERPRETATION:  CT ABDOMEN AND PELVIS WITHOUT IV CONTRAST    CLINICAL STATEMENT: LLQ pain.    COMPARISON: None.    TECHNIQUE: CT scan of the abdomen and pelvis was performed without IV   contrast. Lack of IV contrast limits evaluation. Oral contrast was not   administered. Multiplanar reformations were made.    FINDINGS:  Bibasilar subsegmental atelectasis and a pleural effusion.    Mild nodularity of the liver surface; correlate for cirrhotic changes.   The unenhanced gallbladder, pancreas, spleen and adjacent glands are   grossly unremarkable.    No radiodense renal calculus. No hydroureteronephrosis. There is a   subcentimeter hypodensity left kidney upper pole which is too small to   characterize.    Evaluation of the GI tract is limited without oral contrast. GI tract is   unobstructed. Appendix is unremarkable. No free air or ascites.    Partially visualized left-sided retroperitoneal shunt catheter is seen in   the left anterior lower chest and abdominal subcutaneous soft tissues,   entering the peritoneal cavity in the upper abdomen, extending to the   left lower quadrant with tip terminating near the midline in the pelvis.    Atherosclerotic calcifications are present.    Urinary bladder contains no radiodense debris. Prostate is top normal in   size. Seminal vesicles are symmetric. No free pelvic fluid.    Degenerative changes in the spine. Small left and tiny right   fat-containing inguinal hernias.    IMPRESSION:  No noncontrast CT findings to explain the patient's left lower quadrant   pain.    It is noted that the  shunt courses via the left lower quadrant and   terminates in the upper pelvis in the midline; correlation for when the    shunt was placed is recommended.    Mild nodularity of the liver surface; correlate for cirrhotic changes.                ELVIA LINK M.D., ATTENDING RADIOLOGIST  This document has been electronically signed. Sep 12 2017  5:02PM                < end of copied text >    < from: CT Head No Cont (17 @ 16:11) >    EXAM:  CT BRAIN                            PROCEDURE DATE:  2017          INTERPRETATION:  CT HEAD WITHOUT CONTRAST    CLINICAL STATEMENT: abd pain, confusion.    COMPARISON: None available.    TECHNIQUE: Noncontrast axial CT head was obtained from the skull base to   vertex.    FINDINGS:  Left frontal approach ventriculoperitoneal shunt catheter tip terminates   in the third ventricle. No evidence of hydrocephalus. There is   hypodensity in the left frontal lobe which may be due to gliosis. An   embolization cortical mass is present in the region of the right MCA,   which causes streak artifact limiting evaluation.    There is no evidence of acute intracranial hemorrhage, mass effect or   midline shift. No CT evidence of acute large territory vascular infarct.   The ventricles and cortical sulci are prominent reflecting parenchymal   volume loss. Patchy hypodensities in the periventricular white matter are   nonspecific, but likely sequela of small vessel ischemic disease.   Intracranial atherosclerotic calcifications are present.    Chronic lacunar infarcts in the right thalamus and right basal ganglia.    The visualized paranasal sinuses are well aerated. Partial opacification   of the right inferior mastoid air cells.    IMPRESSION:  No acute intracranial hemorrhage, mass effect or midline shift.    Left-sided  shunt in place. No hydrocephalus.                ELVIA LINK M.D., ATTENDING RADIOLOGIST  This document has been electronically signed. Sep 12 2017  4:15PM                < end of copied text >                      LAUREEN SWEENEY M.D., ATTENDING RADIOLOGIST  This document has been electronically signed. Sep 12 2017  7:05PM                < end of copied text >        ROS  [  ] UNABLE TO ELICIT HPI:  Patient is a 66/M with unknown PMHx, BIB EMS for complaint of LLQ pain, however Patient is very confused, unable to answer questions even with . Used  services: [id number], Mr Cordoba. Majority of Hx obtained from ED provider note. patient is alert but not ortiented to time/place and person.Pt knows his name, but nothing else. Apparently Patient is homeless and was found on the street by EMS. Per triage nurse, pt denies nausea, vomiting.However, pt denied any abdominal pain during examination, and was more concerned about his BP. No knowledge of pt's underlying mentation. Patient's states that  he live with his brother in law in the [place], has h/o hemorrhagic stroke in the past, no h/o seizure, does not know his brother in law's last name, only 1st name, Ivan. does not know the number. (12 Sep 2017 22:05)      Brief Hospital course: Patient has been afebrile in the hospital, UA : weakly positive, Blood cultures negative, Urine culture growing GNR and E. Fecalis. Patient also has a  shunt. Patient is currently on IV rocephin      No Known Allergies      Meds:  sodium chloride 0.9%. 1000 milliLiter(s) IV Continuous <Continuous>  heparin  Injectable 5000 Unit(s) SubCutaneous every 8 hours  cefTRIAXone   IVPB      cefTRIAXone   IVPB 1 Gram(s) IV Intermittent every 24 hours  ergocalciferol 60278 Unit(s) Oral <User Schedule>      SOCIAL HISTORY:  Smoker:  YES / NO        PACK YEARS:                         WHEN QUIT?  ETOH use:  YES / NO               FREQUENCY / QUANTITY:  Ilicit Drug use:  YES / NO  Occupation:  Assisted device use (Cane / Walker):  Live with:    FAMILY HISTORY:      VITALS:  Vital Signs Last 24 Hrs  T(C): 36.6 (14 Sep 2017 05:14), Max: 37.2 (13 Sep 2017 21:18)  T(F): 97.9 (14 Sep 2017 05:14), Max: 99 (13 Sep 2017 21:18)  HR: 71 (14 Sep 2017 05:14) (71 - 101)  BP: 138/74 (14 Sep 2017 05:14) (99/50 - 138/74)  BP(mean): --  RR: 18 (14 Sep 2017 05:14) (16 - 18)  SpO2: 96% (14 Sep 2017 05:14) (96% - 98%)    LABS/DIAGNOSTIC TESTS:                          12.2   6.4   )-----------( 186      ( 14 Sep 2017 07:34 )             36.3     WBC Count: 6.4 K/uL ( @ 07:34)  WBC Count: 10.1 K/uL ( @ 05:56)  WBC Count: 12.8 K/uL ( @ 15:17)          145  |  112<H>  |  10  ----------------------------<  89  3.5   |  27  |  0.82    Ca    8.4      14 Sep 2017 07:34  Phos  3.0       Mg     2.3         TPro  6.7  /  Alb  3.1<L>  /  TBili  0.9  /  DBili  0.2  /  AST  21  /  ALT  26  /  AlkPhos  76  13      Urinalysis Basic - ( 12 Sep 2017 19:25 )    Color: Yellow / Appearance: Clear / S.020 / pH: x  Gluc: x / Ketone: Small  / Bili: Negative / Urobili: Negative   Blood: x / Protein: 30 mg/dL / Nitrite: Negative   Leuk Esterase: Moderate / RBC: 0-2 /HPF / WBC 6-10 /HPF   Sq Epi: x / Non Sq Epi: Few / Bacteria: Few /HPF        LIVER FUNCTIONS - ( 13 Sep 2017 05:56 )  Alb: 3.1 g/dL / Pro: 6.7 g/dL / ALK PHOS: 76 U/L / ALT: 26 U/L DA / AST: 21 U/L / GGT: x             PT/INR - ( 12 Sep 2017 15:17 )   PT: 13.2 sec;   INR: 1.21 ratio         PTT - ( 12 Sep 2017 15:17 )  PTT:32.1 sec    LACTATE:    ABG -     CULTURES:       RADIOLOGY:  < from: Xray Chest 1 View AP/PA (17 @ 18:04) >    EXAM:  CHEST SINGLE AP OR PA                            PROCEDURE DATE:  2017          INTERPRETATION:  Views:1  Comparison: Unavailable at this time  History: Left lower quadrant pain    The lungs are clear of infiltrates and effusions.     The cardiac and   mediastinal contours appear unremarkable.  shunt tubing is noted.    Impression:  1. No evidence of acute pulmonary disease.        < from: CT Abdomen and Pelvis w/ Oral Cont (17 @ 16:48) >    EXAM:  CT ABDOMEN AND PELVIS OC                            PROCEDURE DATE:  2017          INTERPRETATION:  CT ABDOMEN AND PELVIS WITHOUT IV CONTRAST    CLINICAL STATEMENT: LLQ pain.    COMPARISON: None.    TECHNIQUE: CT scan of the abdomen and pelvis was performed without IV   contrast. Lack of IV contrast limits evaluation. Oral contrast was not   administered. Multiplanar reformations were made.    FINDINGS:  Bibasilar subsegmental atelectasis and a pleural effusion.    Mild nodularity of the liver surface; correlate for cirrhotic changes.   The unenhanced gallbladder, pancreas, spleen and adjacent glands are   grossly unremarkable.    No radiodense renal calculus. No hydroureteronephrosis. There is a   subcentimeter hypodensity left kidney upper pole which is too small to   characterize.    Evaluation of the GI tract is limited without oral contrast. GI tract is   unobstructed. Appendix is unremarkable. No free air or ascites.    Partially visualized left-sided retroperitoneal shunt catheter is seen in   the left anterior lower chest and abdominal subcutaneous soft tissues,   entering the peritoneal cavity in the upper abdomen, extending to the   left lower quadrant with tip terminating near the midline in the pelvis.    Atherosclerotic calcifications are present.    Urinary bladder contains no radiodense debris. Prostate is top normal in   size. Seminal vesicles are symmetric. No free pelvic fluid.    Degenerative changes in the spine. Small left and tiny right   fat-containing inguinal hernias.    IMPRESSION:  No noncontrast CT findings to explain the patient's left lower quadrant   pain.    It is noted that the  shunt courses via the left lower quadrant and   terminates in the upper pelvis in the midline; correlation for when the    shunt was placed is recommended.    Mild nodularity of the liver surface; correlate for cirrhotic changes.                ELVIA LINK M.D., ATTENDING RADIOLOGIST  This document has been electronically signed. Sep 12 2017  5:02PM                < end of copied text >    < from: CT Head No Cont (17 @ 16:11) >    EXAM:  CT BRAIN                            PROCEDURE DATE:  2017          INTERPRETATION:  CT HEAD WITHOUT CONTRAST    CLINICAL STATEMENT: abd pain, confusion.    COMPARISON: None available.    TECHNIQUE: Noncontrast axial CT head was obtained from the skull base to   vertex.    FINDINGS:  Left frontal approach ventriculoperitoneal shunt catheter tip terminates   in the third ventricle. No evidence of hydrocephalus. There is   hypodensity in the left frontal lobe which may be due to gliosis. An   embolization cortical mass is present in the region of the right MCA,   which causes streak artifact limiting evaluation.    There is no evidence of acute intracranial hemorrhage, mass effect or   midline shift. No CT evidence of acute large territory vascular infarct.   The ventricles and cortical sulci are prominent reflecting parenchymal   volume loss. Patchy hypodensities in the periventricular white matter are   nonspecific, but likely sequela of small vessel ischemic disease.   Intracranial atherosclerotic calcifications are present.    Chronic lacunar infarcts in the right thalamus and right basal ganglia.    The visualized paranasal sinuses are well aerated. Partial opacification   of the right inferior mastoid air cells.    IMPRESSION:  No acute intracranial hemorrhage, mass effect or midline shift.    Left-sided  shunt in place. No hydrocephalus.                ELVIA LINK M.D., ATTENDING RADIOLOGIST  This document has been electronically signed. Sep 12 2017  4:15PM                < end of copied text >                      LAUREEN SWEENEY M.D., ATTENDING RADIOLOGIST  This document has been electronically signed. Sep 12 2017  7:05PM                < end of copied text >        ROS  [  ] UNABLE TO ELICIT HPI:  Patient is a 66/M with unknown PMHx, BIB EMS for complaint of LLQ pain, however Patient is very confused, unable to answer questions even with . Used  services: [id number], Mr Cordoba. Majority of Hx obtained from ED provider note. patient is alert but not ortiented to time/place and person.Pt knows his name, but nothing else. Apparently Patient is homeless and was found on the street by EMS. Per triage nurse, pt denies nausea, vomiting.However, pt denied any abdominal pain during examination, and was more concerned about his BP. No knowledge of pt's underlying mentation. Patient's states that  he live with his brother in law in the [place], has h/o hemorrhagic stroke in the past, no h/o seizure, does not know his brother in law's last name, only 1st name, Ivan. does not know the number. (12 Sep 2017 22:05)      Brief Hospital course: Patient has been afebrile in the hospital, UA : weakly positive, Blood cultures negative, Urine culture growing GNR and E. Fecalis. Patient also has a  shunt. Patient is currently on IV rocephin. Patient is AOx 1-2, and states that "he was in custodial for 60 days " and then was released. He has stroke about 8 years ago and this time his BP was high so he called EMS thinking he might have another stroke      No Known Allergies      Meds:  sodium chloride 0.9%. 1000 milliLiter(s) IV Continuous <Continuous>  heparin  Injectable 5000 Unit(s) SubCutaneous every 8 hours  cefTRIAXone   IVPB      cefTRIAXone   IVPB 1 Gram(s) IV Intermittent every 24 hours  ergocalciferol 99547 Unit(s) Oral <User Schedule>      SOCIAL HISTORY:  Smoker:  YES / NO        PACK YEARS:                         WHEN QUIT?  ETOH use:  YES / NO               FREQUENCY / QUANTITY:  Ilicit Drug use:  YES / NO  Occupation:  Assisted device use (Cane / Walker):  Live with:    FAMILY HISTORY:      VITALS:  Vital Signs Last 24 Hrs  T(C): 36.6 (14 Sep 2017 05:14), Max: 37.2 (13 Sep 2017 21:18)  T(F): 97.9 (14 Sep 2017 05:14), Max: 99 (13 Sep 2017 21:18)  HR: 71 (14 Sep 2017 05:14) (71 - 101)  BP: 138/74 (14 Sep 2017 05:14) (99/50 - 138/74)  BP(mean): --  RR: 18 (14 Sep 2017 05:14) (16 - 18)  SpO2: 96% (14 Sep 2017 05:14) (96% - 98%)    LABS/DIAGNOSTIC TESTS:                          12.2   6.4   )-----------( 186      ( 14 Sep 2017 07:34 )             36.3     WBC Count: 6.4 K/uL ( @ 07:34)  WBC Count: 10.1 K/uL ( @ 05:56)  WBC Count: 12.8 K/uL ( @ 15:17)          145  |  112<H>  |  10  ----------------------------<  89  3.5   |  27  |  0.82    Ca    8.4      14 Sep 2017 07:34  Phos  3.0       Mg     2.3         TPro  6.7  /  Alb  3.1<L>  /  TBili  0.9  /  DBili  0.2  /  AST  21  /  ALT  26  /  AlkPhos  76        Urinalysis Basic - ( 12 Sep 2017 19:25 )    Color: Yellow / Appearance: Clear / S.020 / pH: x  Gluc: x / Ketone: Small  / Bili: Negative / Urobili: Negative   Blood: x / Protein: 30 mg/dL / Nitrite: Negative   Leuk Esterase: Moderate / RBC: 0-2 /HPF / WBC 6-10 /HPF   Sq Epi: x / Non Sq Epi: Few / Bacteria: Few /HPF        LIVER FUNCTIONS - ( 13 Sep 2017 05:56 )  Alb: 3.1 g/dL / Pro: 6.7 g/dL / ALK PHOS: 76 U/L / ALT: 26 U/L DA / AST: 21 U/L / GGT: x             PT/INR - ( 12 Sep 2017 15:17 )   PT: 13.2 sec;   INR: 1.21 ratio         PTT - ( 12 Sep 2017 15:17 )  PTT:32.1 sec    LACTATE:    ABG -     CULTURES:       RADIOLOGY:  < from: Xray Chest 1 View AP/PA (17 @ 18:04) >    EXAM:  CHEST SINGLE AP OR PA                            PROCEDURE DATE:  2017          INTERPRETATION:  Views:1  Comparison: Unavailable at this time  History: Left lower quadrant pain    The lungs are clear of infiltrates and effusions.     The cardiac and   mediastinal contours appear unremarkable.  shunt tubing is noted.    Impression:  1. No evidence of acute pulmonary disease.        < from: CT Abdomen and Pelvis w/ Oral Cont (17 @ 16:48) >    EXAM:  CT ABDOMEN AND PELVIS OC                            PROCEDURE DATE:  2017          INTERPRETATION:  CT ABDOMEN AND PELVIS WITHOUT IV CONTRAST    CLINICAL STATEMENT: LLQ pain.    COMPARISON: None.    TECHNIQUE: CT scan of the abdomen and pelvis was performed without IV   contrast. Lack of IV contrast limits evaluation. Oral contrast was not   administered. Multiplanar reformations were made.    FINDINGS:  Bibasilar subsegmental atelectasis and a pleural effusion.    Mild nodularity of the liver surface; correlate for cirrhotic changes.   The unenhanced gallbladder, pancreas, spleen and adjacent glands are   grossly unremarkable.    No radiodense renal calculus. No hydroureteronephrosis. There is a   subcentimeter hypodensity left kidney upper pole which is too small to   characterize.    Evaluation of the GI tract is limited without oral contrast. GI tract is   unobstructed. Appendix is unremarkable. No free air or ascites.    Partially visualized left-sided retroperitoneal shunt catheter is seen in   the left anterior lower chest and abdominal subcutaneous soft tissues,   entering the peritoneal cavity in the upper abdomen, extending to the   left lower quadrant with tip terminating near the midline in the pelvis.    Atherosclerotic calcifications are present.    Urinary bladder contains no radiodense debris. Prostate is top normal in   size. Seminal vesicles are symmetric. No free pelvic fluid.    Degenerative changes in the spine. Small left and tiny right   fat-containing inguinal hernias.    IMPRESSION:  No noncontrast CT findings to explain the patient's left lower quadrant   pain.    It is noted that the  shunt courses via the left lower quadrant and   terminates in the upper pelvis in the midline; correlation for when the    shunt was placed is recommended.    Mild nodularity of the liver surface; correlate for cirrhotic changes.                ELVIA LINK M.D., ATTENDING RADIOLOGIST  This document has been electronically signed. Sep 12 2017  5:02PM                < end of copied text >    < from: CT Head No Cont (17 @ 16:11) >    EXAM:  CT BRAIN                            PROCEDURE DATE:  2017          INTERPRETATION:  CT HEAD WITHOUT CONTRAST    CLINICAL STATEMENT: abd pain, confusion.    COMPARISON: None available.    TECHNIQUE: Noncontrast axial CT head was obtained from the skull base to   vertex.    FINDINGS:  Left frontal approach ventriculoperitoneal shunt catheter tip terminates   in the third ventricle. No evidence of hydrocephalus. There is   hypodensity in the left frontal lobe which may be due to gliosis. An   embolization cortical mass is present in the region of the right MCA,   which causes streak artifact limiting evaluation.    There is no evidence of acute intracranial hemorrhage, mass effect or   midline shift. No CT evidence of acute large territory vascular infarct.   The ventricles and cortical sulci are prominent reflecting parenchymal   volume loss. Patchy hypodensities in the periventricular white matter are   nonspecific, but likely sequela of small vessel ischemic disease.   Intracranial atherosclerotic calcifications are present.    Chronic lacunar infarcts in the right thalamus and right basal ganglia.    The visualized paranasal sinuses are well aerated. Partial opacification   of the right inferior mastoid air cells.    IMPRESSION:  No acute intracranial hemorrhage, mass effect or midline shift.    Left-sided  shunt in place. No hydrocephalus.                ELVIA LINK M.D., ATTENDING RADIOLOGIST  This document has been electronically signed. Sep 12 2017  4:15PM                < end of copied text >                      LAUREEN SWEENEY M.D., ATTENDING RADIOLOGIST  This document has been electronically signed. Sep 12 2017  7:05PM                < end of copied text >        ROS  [ x ] UNABLE TO ELICIT: limited given patient's underlying medical condition HPI:  Patient is a 66/M with unknown PMHx, BIB EMS for complaint of LLQ pain, was found on the street by EMS. Per triage nurse, pt denies nausea, vomiting.However, pt denied any abdominal pain during examination, and was more concerned about his BP.  Patient's states that  he live with his brother in law in the [place], has h/o hemorrhagic stroke in the past, no h/o seizure, and has a h/o a  shunt after having had a hemorrhagic CVA. Spoke with pt via a [place] speaking doctor and he has no dysuria or cloudy urine, no fevers or chills.      No Known Allergies      Meds:  sodium chloride 0.9%. 1000 milliLiter(s) IV Continuous <Continuous>  heparin  Injectable 5000 Unit(s) SubCutaneous every 8 hours  cefTRIAXone   IVPB      cefTRIAXone   IVPB 1 Gram(s) IV Intermittent every 24 hours  ergocalciferol 35626 Unit(s) Oral <User Schedule>      SOCIAL HISTORY:  Smoker:  YES / NO        PACK YEARS:                         WHEN QUIT?  ETOH use:  YES / NO               FREQUENCY / QUANTITY:  Ilicit Drug use:  YES / NO  Occupation:  Assisted device use (Cane / Walker):  Live with:    FAMILY HISTORY:      VITALS:  Vital Signs Last 24 Hrs  T(C): 36.6 (14 Sep 2017 05:14), Max: 37.2 (13 Sep 2017 21:18)  T(F): 97.9 (14 Sep 2017 05:14), Max: 99 (13 Sep 2017 21:18)  HR: 71 (14 Sep 2017 05:14) (71 - 101)  BP: 138/74 (14 Sep 2017 05:14) (99/50 - 138/74)  BP(mean): --  RR: 18 (14 Sep 2017 05:14) (16 - 18)  SpO2: 96% (14 Sep 2017 05:14) (96% - 98%)    LABS/DIAGNOSTIC TESTS:                          12.2   6.4   )-----------( 186      ( 14 Sep 2017 07:34 )             36.3     WBC Count: 6.4 K/uL ( @ 07:34)  WBC Count: 10.1 K/uL ( @ 05:56)  WBC Count: 12.8 K/uL ( @ 15:17)          145  |  112<H>  |  10  ----------------------------<  89  3.5   |  27  |  0.82    Ca    8.4      14 Sep 2017 07:34  Phos  3.0       Mg     2.3         TPro  6.7  /  Alb  3.1<L>  /  TBili  0.9  /  DBili  0.2  /  AST  21  /  ALT  26  /  AlkPhos  76  -13      Urinalysis Basic - ( 12 Sep 2017 19:25 )    Color: Yellow / Appearance: Clear / S.020 / pH: x  Gluc: x / Ketone: Small  / Bili: Negative / Urobili: Negative   Blood: x / Protein: 30 mg/dL / Nitrite: Negative   Leuk Esterase: Moderate / RBC: 0-2 /HPF / WBC 6-10 /HPF   Sq Epi: x / Non Sq Epi: Few / Bacteria: Few /HPF        LIVER FUNCTIONS - ( 13 Sep 2017 05:56 )  Alb: 3.1 g/dL / Pro: 6.7 g/dL / ALK PHOS: 76 U/L / ALT: 26 U/L DA / AST: 21 U/L / GGT: x             PT/INR - ( 12 Sep 2017 15:17 )   PT: 13.2 sec;   INR: 1.21 ratio         PTT - ( 12 Sep 2017 15:17 )  PTT:32.1 sec    LACTATE:    ABG -     CULTURES:       RADIOLOGY:  < from: Xray Chest 1 View AP/PA (17 @ 18:04) >    EXAM:  CHEST SINGLE AP OR PA                            PROCEDURE DATE:  2017          INTERPRETATION:  Views:1  Comparison: Unavailable at this time  History: Left lower quadrant pain    The lungs are clear of infiltrates and effusions.     The cardiac and   mediastinal contours appear unremarkable.  shunt tubing is noted.    Impression:  1. No evidence of acute pulmonary disease.        < from: CT Abdomen and Pelvis w/ Oral Cont (17 @ 16:48) >    EXAM:  CT ABDOMEN AND PELVIS OC                            PROCEDURE DATE:  2017          INTERPRETATION:  CT ABDOMEN AND PELVIS WITHOUT IV CONTRAST    CLINICAL STATEMENT: LLQ pain.    COMPARISON: None.    TECHNIQUE: CT scan of the abdomen and pelvis was performed without IV   contrast. Lack of IV contrast limits evaluation. Oral contrast was not   administered. Multiplanar reformations were made.    FINDINGS:  Bibasilar subsegmental atelectasis and a pleural effusion.    Mild nodularity of the liver surface; correlate for cirrhotic changes.   The unenhanced gallbladder, pancreas, spleen and adjacent glands are   grossly unremarkable.    No radiodense renal calculus. No hydroureteronephrosis. There is a   subcentimeter hypodensity left kidney upper pole which is too small to   characterize.    Evaluation of the GI tract is limited without oral contrast. GI tract is   unobstructed. Appendix is unremarkable. No free air or ascites.    Partially visualized left-sided retroperitoneal shunt catheter is seen in   the left anterior lower chest and abdominal subcutaneous soft tissues,   entering the peritoneal cavity in the upper abdomen, extending to the   left lower quadrant with tip terminating near the midline in the pelvis.    Atherosclerotic calcifications are present.    Urinary bladder contains no radiodense debris. Prostate is top normal in   size. Seminal vesicles are symmetric. No free pelvic fluid.    Degenerative changes in the spine. Small left and tiny right   fat-containing inguinal hernias.    IMPRESSION:  No noncontrast CT findings to explain the patient's left lower quadrant   pain.    It is noted that the  shunt courses via the left lower quadrant and   terminates in the upper pelvis in the midline; correlation for when the    shunt was placed is recommended.    Mild nodularity of the liver surface; correlate for cirrhotic changes.                ELVIA LINK M.D., ATTENDING RADIOLOGIST  This document has been electronically signed. Sep 12 2017  5:02PM                < end of copied text >    < from: CT Head No Cont (17 @ 16:11) >    EXAM:  CT BRAIN                            PROCEDURE DATE:  2017          INTERPRETATION:  CT HEAD WITHOUT CONTRAST    CLINICAL STATEMENT: abd pain, confusion.    COMPARISON: None available.    TECHNIQUE: Noncontrast axial CT head was obtained from the skull base to   vertex.    FINDINGS:  Left frontal approach ventriculoperitoneal shunt catheter tip terminates   in the third ventricle. No evidence of hydrocephalus. There is   hypodensity in the left frontal lobe which may be due to gliosis. An   embolization cortical mass is present in the region of the right MCA,   which causes streak artifact limiting evaluation.    There is no evidence of acute intracranial hemorrhage, mass effect or   midline shift. No CT evidence of acute large territory vascular infarct.   The ventricles and cortical sulci are prominent reflecting parenchymal   volume loss. Patchy hypodensities in the periventricular white matter are   nonspecific, but likely sequela of small vessel ischemic disease.   Intracranial atherosclerotic calcifications are present.    Chronic lacunar infarcts in the right thalamus and right basal ganglia.    The visualized paranasal sinuses are well aerated. Partial opacification   of the right inferior mastoid air cells.    IMPRESSION:  No acute intracranial hemorrhage, mass effect or midline shift.    Left-sided  shunt in place. No hydrocephalus.                ELVIA LINK M.D., ATTENDING RADIOLOGIST  This document has been electronically signed. Sep 12 2017  4:15PM                < end of copied text >                      LAUREEN SWEENEY M.D., ATTENDING RADIOLOGIST  This document has been electronically signed. Sep 12 2017  7:05PM                < end of copied text >        ROS  [ ] UNABLE TO ELICIT: limited given patient's underlying medical condition

## 2017-09-14 NOTE — CONSULT NOTE ADULT - GASTROINTESTINAL DETAILS
no masses palpable/nontender/no distention/soft/normal/bowel sounds normal nontender/no rebound tenderness/no rigidity/normal/no masses palpable/no distention/bowel sounds normal/soft/no guarding

## 2017-09-14 NOTE — PROGRESS NOTE ADULT - PROBLEM SELECTOR PLAN 2
Resolved.  UA w/ +ve LE and 6-10 WBC, no leukocytosis of fever overnight  - UCx 9/12; E. faecalis, less than 50K, treated w/ Rocephin  ID Dr. Fitzgerald; *** no UTI, no antibiotics needed - discontinued Rocephin

## 2017-09-14 NOTE — CONSULT NOTE ADULT - RS GEN PE MLT RESP DETAILS PC
good air movement/normal/respirations non-labored/clear to auscultation bilaterally/airway patent/breath sounds equal/no chest wall tenderness

## 2017-09-14 NOTE — PROGRESS NOTE ADULT - SUBJECTIVE AND OBJECTIVE BOX
PGY 1 Note discussed with supervising resident and primary attending    Patient is a 66y old  Male who presents with a chief complaint of LLQ pain (12 Sep 2017 22:05)      INTERVAL HPI/OVERNIGHT EVENTS: Patient seen and examined at bedside with no new complaints    MEDICATIONS  (STANDING):  sodium chloride 0.9%. 1000 milliLiter(s) (125 mL/Hr) IV Continuous <Continuous>  heparin  Injectable 5000 Unit(s) SubCutaneous every 8 hours  ergocalciferol 98870 Unit(s) Oral <User Schedule>    MEDICATIONS  (PRN):      __________________________________________________  REVIEW OF SYSTEMS:    CONSTITUTIONAL: No fever,   EYES: No acute visual disturbances  NECK: No pain or stiffness  RESPIRATORY: No cough; No shortness of breath  CARDIOVASCULAR: No chest pain, no palpitations  GASTROINTESTINAL: No pain. No nausea or vomiting; No diarrhea   NEUROLOGICAL: No headache or numbness, no tremors  MUSCULOSKELETAL: No joint pain, no muscle pain  GENITOURINARY: No dysuria, no frequency, no hesitancy  PSYCHIATRY: No depression , no anxiety  ALL OTHER  ROS negative        Vital Signs Last 24 Hrs  T(C): 37.2 (14 Sep 2017 13:50), Max: 37.2 (13 Sep 2017 21:18)  T(F): 99 (14 Sep 2017 13:50), Max: 99 (13 Sep 2017 21:18)  HR: 73 (14 Sep 2017 13:50) (71 - 73)  BP: 118/73 (14 Sep 2017 13:50) (99/50 - 138/74)  BP(mean): --  RR: 16 (14 Sep 2017 13:50) (16 - 18)  SpO2: 99% (14 Sep 2017 13:50) (96% - 99%)    ________________________________________________  PHYSICAL EXAM:  GENERAL: NAD  HEENT: Normocephalic;  conjunctivae and sclerae clear; moist mucous membranes;   NECK : supple  CHEST/LUNG: Clear to auscultation bilaterally with good air entry   HEART: S1 S2  regular; no murmurs, gallops or rubs  ABDOMEN: Soft, Nontender, Nondistended; Bowel sounds present  EXTREMITIES: No cyanosis; no edema; no calf tenderness  NERVOUS SYSTEM:  Awake and alert; Oriented  to place, person and time ; no new deficits    _________________________________________________  LABS:                        12.2   6.4   )-----------( 186      ( 14 Sep 2017 07:34 )             36.3     09-14    145  |  112<H>  |  10  ----------------------------<  89  3.5   |  27  |  0.82    Ca    8.4      14 Sep 2017 07:34  Phos  3.0     09-13  Mg     2.3     09-13    TPro  6.7  /  Alb  3.1<L>  /  TBili  0.9  /  DBili  0.2  /  AST  21  /  ALT  26  /  AlkPhos  76  09-13        CAPILLARY BLOOD GLUCOSE            RADIOLOGY & ADDITIONAL TESTS:    Imaging Personally Reviewed:  YES    Consultant(s) Notes Reviewed:   YES    Care Discussed with Consultants : YES    Plan of care was discussed with patient and /or primary care giver; all questions and concerns were addressed and care was aligned with patient's wishes.

## 2017-09-14 NOTE — CONSULT NOTE ADULT - ASSESSMENT
UTI - doubt, as he has no urinary symptoms, u/a is not sig and urine cult shows insig growth, no fevers and no leukocytosis  plan - dc all abxs  reconsult prn

## 2017-09-15 RX ADMIN — HEPARIN SODIUM 5000 UNIT(S): 5000 INJECTION INTRAVENOUS; SUBCUTANEOUS at 14:42

## 2017-09-15 RX ADMIN — HEPARIN SODIUM 5000 UNIT(S): 5000 INJECTION INTRAVENOUS; SUBCUTANEOUS at 21:16

## 2017-09-15 RX ADMIN — HEPARIN SODIUM 5000 UNIT(S): 5000 INJECTION INTRAVENOUS; SUBCUTANEOUS at 05:59

## 2017-09-15 NOTE — PHYSICAL THERAPY INITIAL EVALUATION ADULT - LIVES WITH, PROFILE
patient is homeless/was found on street as per ER note. Patient is unable to provide any information

## 2017-09-15 NOTE — PROGRESS NOTE ADULT - PROBLEM SELECTOR PLAN 1
AAOx2, otherwise mental status unreliable and inconsistent, CT negative for stroke  - SW consulted; possible plan for shelter vs long term placement  - Negative urine toxicology and blood alcohol levels  - Negative Hepatitis panel  ***F/u PT recommendation; patient ambulatory w/ minimal assistance, pending  ***F/u  for placement

## 2017-09-15 NOTE — PROGRESS NOTE ADULT - SUBJECTIVE AND OBJECTIVE BOX
PGY 1 Note discussed with supervising resident and primary attending    Patient is a 66y old  Male who presents with a chief complaint of LLQ pain (12 Sep 2017 22:05)      INTERVAL HPI/OVERNIGHT EVENTS: Patient seen and examined at bedside with no new complaints, slight unsteadiness with ambulating noted w/ PT evalulation, pending psychiatric clearence for medical capacity - patient admitted to being homeless     MEDICATIONS  (STANDING):  sodium chloride 0.9%. 1000 milliLiter(s) (125 mL/Hr) IV Continuous <Continuous>  heparin  Injectable 5000 Unit(s) SubCutaneous every 8 hours  ergocalciferol 61238 Unit(s) Oral <User Schedule>    MEDICATIONS  (PRN):      __________________________________________________  REVIEW OF SYSTEMS:    CONSTITUTIONAL: No fever,   EYES: No acute visual disturbances  NECK: No pain or stiffness  RESPIRATORY: No cough; No shortness of breath  CARDIOVASCULAR: No chest pain, no palpitations  GASTROINTESTINAL: No pain. No nausea or vomiting; No diarrhea   NEUROLOGICAL: No headache or numbness, no tremors  MUSCULOSKELETAL: No joint pain, no muscle pain  GENITOURINARY: No dysuria, no frequency, no hesitancy  PSYCHIATRY: No depression , no anxiety  ALL OTHER  ROS negative        Vital Signs Last 24 Hrs  T(C): 36.4 (15 Sep 2017 14:00), Max: 36.6 (15 Sep 2017 05:12)  T(F): 97.6 (15 Sep 2017 14:00), Max: 97.9 (15 Sep 2017 05:12)  HR: 64 (15 Sep 2017 14:00) (63 - 72)  BP: 119/65 (15 Sep 2017 14:00) (118/68 - 178/75)  BP(mean): --  RR: 18 (15 Sep 2017 14:00) (18 - 18)  SpO2: 98% (15 Sep 2017 14:00) (97% - 99%)    ________________________________________________  PHYSICAL EXAM:  GENERAL: NAD  HEENT: Normocephalic;  conjunctivae and sclerae clear; moist mucous membranes;   NECK : supple  CHEST/LUNG: Clear to auscultation bilaterally with good air entry   HEART: S1 S2  regular; no murmurs, gallops or rubs  ABDOMEN: Soft, Nontender, Nondistended; Bowel sounds present  EXTREMITIES: No cyanosis; no edema; no calf tenderness  NERVOUS SYSTEM:  Awake and alert; Oriented to person; no new deficits    _________________________________________________  LABS:                        12.2   6.4   )-----------( 186      ( 14 Sep 2017 07:34 )             36.3     09-14    145  |  112<H>  |  10  ----------------------------<  89  3.5   |  27  |  0.82    Ca    8.4      14 Sep 2017 07:34        Consultant(s) Notes Reviewed:   YES    Care Discussed with Consultants : YES    Plan of care was discussed with patient and /or primary care giver; all questions and concerns were addressed and care was aligned with patient's wishes.

## 2017-09-15 NOTE — BEHAVIORAL HEALTH ASSESSMENT NOTE - SUMMARY
This is a 67 y/o , male was admitted with AMS.  Patient was interviewed via phone interpreting services.  Patient stated:"I had high blood pressure"." I had trombosis".  Stated living in [place], [place].  Denied psych history.  Denied using alcohol or illegal drugs.  Patient is a/o x2,cooperative verbal,behaviorally controlled.  Speech is goal directed logical.  Denied feeling anxious or depressed.Denied s/h thought.Denied a/v hallucinations.memory and cognition-limited.I&J-limited

## 2017-09-16 RX ORDER — ACETAMINOPHEN 500 MG
650 TABLET ORAL ONCE
Qty: 0 | Refills: 0 | Status: COMPLETED | OUTPATIENT
Start: 2017-09-16 | End: 2017-09-16

## 2017-09-16 RX ADMIN — Medication 650 MILLIGRAM(S): at 16:04

## 2017-09-16 RX ADMIN — HEPARIN SODIUM 5000 UNIT(S): 5000 INJECTION INTRAVENOUS; SUBCUTANEOUS at 21:21

## 2017-09-16 RX ADMIN — Medication 650 MILLIGRAM(S): at 17:00

## 2017-09-16 RX ADMIN — HEPARIN SODIUM 5000 UNIT(S): 5000 INJECTION INTRAVENOUS; SUBCUTANEOUS at 12:35

## 2017-09-16 RX ADMIN — SODIUM CHLORIDE 125 MILLILITER(S): 9 INJECTION INTRAMUSCULAR; INTRAVENOUS; SUBCUTANEOUS at 21:22

## 2017-09-16 RX ADMIN — HEPARIN SODIUM 5000 UNIT(S): 5000 INJECTION INTRAVENOUS; SUBCUTANEOUS at 05:40

## 2017-09-16 NOTE — PROGRESS NOTE ADULT - SUBJECTIVE AND OBJECTIVE BOX
PGY 1 Note discussed with supervising resident and primary attending    Patient is a 66y old  Male who presents with a chief complaint of LLQ pain (12 Sep 2017 22:05)      INTERVAL HPI/OVERNIGHT EVENTS: Patient seen and examined at bedside with no new complaints  MEDICATIONS  (STANDING):  sodium chloride 0.9%. 1000 milliLiter(s) (125 mL/Hr) IV Continuous <Continuous>  heparin  Injectable 5000 Unit(s) SubCutaneous every 8 hours  ergocalciferol 62593 Unit(s) Oral <User Schedule>    MEDICATIONS  (PRN):      __________________________________________________  REVIEW OF SYSTEMS:    CONSTITUTIONAL: No fever,   EYES: No acute visual disturbances  NECK: No pain or stiffness  RESPIRATORY: No cough; No shortness of breath  CARDIOVASCULAR: No chest pain, no palpitations  GASTROINTESTINAL: No pain. No nausea or vomiting; No diarrhea   NEUROLOGICAL: No headache or numbness, no tremors  MUSCULOSKELETAL: No joint pain, no muscle pain  GENITOURINARY: No dysuria, no frequency, no hesitancy  PSYCHIATRY: No depression , no anxiety  ALL OTHER  ROS negative        Vital Signs Last 24 Hrs  T(C): 36.6 (16 Sep 2017 21:24), Max: 36.6 (16 Sep 2017 21:24)  T(F): 97.8 (16 Sep 2017 21:24), Max: 97.8 (16 Sep 2017 21:24)  HR: 64 (16 Sep 2017 21:24) (64 - 73)  BP: 94/54 (16 Sep 2017 21:24) (94/54 - 151/77)  BP(mean): --  RR: 18 (16 Sep 2017 21:24) (18 - 18)  SpO2: 98% (16 Sep 2017 21:24) (98% - 98%)    ________________________________________________  PHYSICAL EXAM:  GENERAL: NAD  HEENT: Normocephalic;  conjunctivae and sclerae clear; moist mucous membranes;   NECK : supple  CHEST/LUNG: Clear to auscultation bilaterally with good air entry   HEART: S1 S2  regular; no murmurs, gallops or rubs  ABDOMEN: Soft, Nontender, Nondistended; Bowel sounds present  EXTREMITIES: No cyanosis; no edema; no calf tenderness  NERVOUS SYSTEM:  Awake and alert; Oriented  to place, person and time ; no new deficits    _________________________________________________  LABS:              CAPILLARY BLOOD GLUCOSE            RADIOLOGY & ADDITIONAL TESTS:    Imaging Personally Reviewed:  YES    Consultant(s) Notes Reviewed:   YES    Care Discussed with Consultants : YES    Plan of care was discussed with patient and /or primary care giver; all questions and concerns were addressed and care was aligned with patient's wishes.

## 2017-09-17 LAB
ANION GAP SERPL CALC-SCNC: 5 MMOL/L — SIGNIFICANT CHANGE UP (ref 5–17)
BUN SERPL-MCNC: 9 MG/DL — SIGNIFICANT CHANGE UP (ref 7–18)
CALCIUM SERPL-MCNC: 8.7 MG/DL — SIGNIFICANT CHANGE UP (ref 8.4–10.5)
CHLORIDE SERPL-SCNC: 109 MMOL/L — HIGH (ref 96–108)
CO2 SERPL-SCNC: 30 MMOL/L — SIGNIFICANT CHANGE UP (ref 22–31)
CREAT SERPL-MCNC: 0.91 MG/DL — SIGNIFICANT CHANGE UP (ref 0.5–1.3)
GLUCOSE SERPL-MCNC: 82 MG/DL — SIGNIFICANT CHANGE UP (ref 70–99)
HCT VFR BLD CALC: 37.7 % — LOW (ref 39–50)
HGB BLD-MCNC: 13 G/DL — SIGNIFICANT CHANGE UP (ref 13–17)
MAGNESIUM SERPL-MCNC: 2.3 MG/DL — SIGNIFICANT CHANGE UP (ref 1.6–2.6)
MCHC RBC-ENTMCNC: 30 PG — SIGNIFICANT CHANGE UP (ref 27–34)
MCHC RBC-ENTMCNC: 34.3 GM/DL — SIGNIFICANT CHANGE UP (ref 32–36)
MCV RBC AUTO: 87.4 FL — SIGNIFICANT CHANGE UP (ref 80–100)
PHOSPHATE SERPL-MCNC: 2.9 MG/DL — SIGNIFICANT CHANGE UP (ref 2.5–4.5)
PLATELET # BLD AUTO: 210 K/UL — SIGNIFICANT CHANGE UP (ref 150–400)
POTASSIUM SERPL-MCNC: 3.8 MMOL/L — SIGNIFICANT CHANGE UP (ref 3.5–5.3)
POTASSIUM SERPL-SCNC: 3.8 MMOL/L — SIGNIFICANT CHANGE UP (ref 3.5–5.3)
RBC # BLD: 4.31 M/UL — SIGNIFICANT CHANGE UP (ref 4.2–5.8)
RBC # FLD: 12.7 % — SIGNIFICANT CHANGE UP (ref 10.3–14.5)
SODIUM SERPL-SCNC: 144 MMOL/L — SIGNIFICANT CHANGE UP (ref 135–145)
WBC # BLD: 7.7 K/UL — SIGNIFICANT CHANGE UP (ref 3.8–10.5)
WBC # FLD AUTO: 7.7 K/UL — SIGNIFICANT CHANGE UP (ref 3.8–10.5)

## 2017-09-17 RX ADMIN — HEPARIN SODIUM 5000 UNIT(S): 5000 INJECTION INTRAVENOUS; SUBCUTANEOUS at 13:15

## 2017-09-17 RX ADMIN — HEPARIN SODIUM 5000 UNIT(S): 5000 INJECTION INTRAVENOUS; SUBCUTANEOUS at 22:00

## 2017-09-17 RX ADMIN — HEPARIN SODIUM 5000 UNIT(S): 5000 INJECTION INTRAVENOUS; SUBCUTANEOUS at 05:20

## 2017-09-17 RX ADMIN — SODIUM CHLORIDE 125 MILLILITER(S): 9 INJECTION INTRAMUSCULAR; INTRAVENOUS; SUBCUTANEOUS at 13:15

## 2017-09-17 NOTE — PROGRESS NOTE ADULT - SUBJECTIVE AND OBJECTIVE BOX
Medical attending follow up:    Patient is a 66y old  Male who presents with a chief complaint of LLQ pain (12 Sep 2017 22:05)      INTERVAL HPI/OVERNIGHT EVENTS: Patient seen and examined at bedside with no new complaints, slight unsteadiness with ambulating noted w/ PT evalulation, pending psychiatric clearence for medical capacity - patient admitted to being homeless     MEDICATIONS  (STANDING):  sodium chloride 0.9%. 1000 milliLiter(s) (125 mL/Hr) IV Continuous <Continuous>  heparin  Injectable 5000 Unit(s) SubCutaneous every 8 hours  ergocalciferol 64567 Unit(s) Oral <User Schedule>    Vital Signs Last 24 Hrs                        13.0   7.7   )-----------( 210      ( 17 Sep 2017 06:59 )             37.7   09-17    144  |  109<H>  |  9   ----------------------------<  82  3.8   |  30  |  0.91    Ca    8.7      17 Sep 2017 06:59  Phos  2.9     09-17  Mg     2.3     09-17    __________________________________________________  REVIEW OF SYSTEMS:    CONSTITUTIONAL: No fever,   EYES: No acute visual disturbances  NECK: No pain or stiffness  RESPIRATORY: No cough; No shortness of breath  CARDIOVASCULAR: No chest pain, no palpitations  GASTROINTESTINAL: No pain. No nausea or vomiting; No diarrhea   NEUROLOGICAL: No headache or numbness, no tremors  MUSCULOSKELETAL: No joint pain, no muscle pain  GENITOURINARY: No dysuria, no frequency, no hesitancy  PSYCHIATRY: No depression , no anxiety  ALL OTHER  ROS negative        ________________________________________________  PHYSICAL EXAM:  GENERAL: NAD  HEENT: Normocephalic;  conjunctivae and sclerae clear; moist mucous membranes;   NECK : supple  CHEST/LUNG: Clear to auscultation bilaterally with good air entry   HEART: S1 S2  regular; no murmurs, gallops or rubs  ABDOMEN: Soft, Nontender, Nondistended; Bowel sounds present  EXTREMITIES: No cyanosis; no edema; no calf tenderness  NERVOUS SYSTEM:  Awake and alert; Oriented to person; no new deficits    _________________________________________________  LABS:                 Consultant(s) Notes Reviewed:   YES    Care Discussed with Consultants : YES    Plan of care was discussed with patient and /or primary care giver; all questions and concerns were addressed and care was aligned with patient's wishes.

## 2017-09-18 LAB
CULTURE RESULTS: SIGNIFICANT CHANGE UP
CULTURE RESULTS: SIGNIFICANT CHANGE UP
SPECIMEN SOURCE: SIGNIFICANT CHANGE UP
SPECIMEN SOURCE: SIGNIFICANT CHANGE UP

## 2017-09-18 RX ADMIN — HEPARIN SODIUM 5000 UNIT(S): 5000 INJECTION INTRAVENOUS; SUBCUTANEOUS at 06:35

## 2017-09-18 RX ADMIN — HEPARIN SODIUM 5000 UNIT(S): 5000 INJECTION INTRAVENOUS; SUBCUTANEOUS at 21:09

## 2017-09-18 RX ADMIN — HEPARIN SODIUM 5000 UNIT(S): 5000 INJECTION INTRAVENOUS; SUBCUTANEOUS at 13:08

## 2017-09-18 RX ADMIN — SODIUM CHLORIDE 125 MILLILITER(S): 9 INJECTION INTRAMUSCULAR; INTRAVENOUS; SUBCUTANEOUS at 13:08

## 2017-09-18 NOTE — DISCHARGE NOTE ADULT - MEDICATION SUMMARY - MEDICATIONS TO TAKE
I will START or STAY ON the medications listed below when I get home from the hospital:  None I will START or STAY ON the medications listed below when I get home from the hospital:    hydrocortisone 1% topical cream  -- 1 application on skin once a day  -- Indication: For For rash    Multiple Vitamins oral tablet  -- 1 tab(s) by mouth once a day  -- Indication: For Supplement    cyanocobalamin 500 mcg oral tablet  -- 1 tab(s) by mouth once a day  -- Indication: For Supplement    folic acid 1 mg oral tablet  -- 1 tab(s) by mouth once a day  -- Indication: For Supplement

## 2017-09-18 NOTE — PROGRESS NOTE ADULT - SUBJECTIVE AND OBJECTIVE BOX
PGY 1 Note discussed with supervising resident and primary attending    Patient is a 66y old  Male who presents with a chief complaint of LLQ pain (12 Sep 2017 22:05)      INTERVAL HPI/OVERNIGHT EVENTS: Patient seen and examined at bedside with no new complaints, medically table for discharge    MEDICATIONS  (STANDING):  sodium chloride 0.9%. 1000 milliLiter(s) (125 mL/Hr) IV Continuous <Continuous>  heparin  Injectable 5000 Unit(s) SubCutaneous every 8 hours  ergocalciferol 86557 Unit(s) Oral <User Schedule>    MEDICATIONS  (PRN):      __________________________________________________  REVIEW OF SYSTEMS:    CONSTITUTIONAL: No fever,   EYES: No acute visual disturbances  NECK: No pain or stiffness  RESPIRATORY: No cough; No shortness of breath  CARDIOVASCULAR: No chest pain, no palpitations  GASTROINTESTINAL: No pain. No nausea or vomiting; No diarrhea   NEUROLOGICAL: No headache or numbness, no tremors  MUSCULOSKELETAL: No joint pain, no muscle pain  GENITOURINARY: No dysuria, no frequency, no hesitancy  PSYCHIATRY: No depression , no anxiety  ALL OTHER  ROS negative        Vital Signs Last 24 Hrs  T(C): 36.6 (18 Sep 2017 05:03), Max: 36.7 (17 Sep 2017 21:20)  T(F): 97.9 (18 Sep 2017 05:03), Max: 98.1 (17 Sep 2017 21:20)  HR: 80 (18 Sep 2017 05:03) (68 - 80)  BP: 145/80 (18 Sep 2017 05:03) (118/70 - 145/80)  BP(mean): --  RR: 17 (18 Sep 2017 05:03) (16 - 17)  SpO2: 95% (18 Sep 2017 05:03) (95% - 98%)    ________________________________________________  PHYSICAL EXAM:  GENERAL: NAD  HEENT: Normocephalic;  conjunctivae and sclerae clear; moist mucous membranes;   NECK : supple  CHEST/LUNG: Clear to auscultation bilaterally with good air entry   HEART: S1 S2  regular; no murmurs, gallops or rubs  ABDOMEN: Soft, Nontender, Nondistended; Bowel sounds present  EXTREMITIES: No cyanosis; no edema; no calf tenderness  NERVOUS SYSTEM:  Awake and alert; Oriented  to place, person and time ; no new deficits    _________________________________________________  LABS:                        13.0   7.7   )-----------( 210      ( 17 Sep 2017 06:59 )             37.7     09-17    144  |  109<H>  |  9   ----------------------------<  82  3.8   |  30  |  0.91    Ca    8.7      17 Sep 2017 06:59  Phos  2.9     09-17  Mg     2.3     09-17          CAPILLARY BLOOD GLUCOSE            RADIOLOGY & ADDITIONAL TESTS:    Imaging Personally Reviewed:  YES    Consultant(s) Notes Reviewed:   YES    Care Discussed with Consultants : YES    Plan of care was discussed with patient and /or primary care giver; all questions and concerns were addressed and care was aligned with patient's wishes. PGY 1 Note discussed with supervising resident and primary attending    Patient is a 66y old  Male who presents with a chief complaint of LLQ pain (12 Sep 2017 22:05)      INTERVAL HPI/OVERNIGHT EVENTS: Patient seen and examined at bedside with no new complaints, medically stable for discharge    MEDICATIONS  (STANDING):  sodium chloride 0.9%. 1000 milliLiter(s) (125 mL/Hr) IV Continuous <Continuous>  heparin  Injectable 5000 Unit(s) SubCutaneous every 8 hours  ergocalciferol 57865 Unit(s) Oral <User Schedule>    MEDICATIONS  (PRN):      __________________________________________________  REVIEW OF SYSTEMS:    CONSTITUTIONAL: No fever,   EYES: No acute visual disturbances  NECK: No pain or stiffness  RESPIRATORY: No cough; No shortness of breath  CARDIOVASCULAR: No chest pain, no palpitations  GASTROINTESTINAL: No pain. No nausea or vomiting; No diarrhea   NEUROLOGICAL: No headache or numbness, no tremors  MUSCULOSKELETAL: No joint pain, no muscle pain  GENITOURINARY: No dysuria, no frequency, no hesitancy  PSYCHIATRY: No depression , no anxiety  ALL OTHER  ROS negative        Vital Signs Last 24 Hrs  T(C): 36.6 (18 Sep 2017 05:03), Max: 36.7 (17 Sep 2017 21:20)  T(F): 97.9 (18 Sep 2017 05:03), Max: 98.1 (17 Sep 2017 21:20)  HR: 80 (18 Sep 2017 05:03) (68 - 80)  BP: 145/80 (18 Sep 2017 05:03) (118/70 - 145/80)  BP(mean): --  RR: 17 (18 Sep 2017 05:03) (16 - 17)  SpO2: 95% (18 Sep 2017 05:03) (95% - 98%)    ________________________________________________  PHYSICAL EXAM:  GENERAL: NAD  HEENT: Normocephalic;  conjunctivae and sclerae clear; moist mucous membranes;   NECK : supple  CHEST/LUNG: Clear to auscultation bilaterally with good air entry   HEART: S1 S2  regular; no murmurs, gallops or rubs  ABDOMEN: Soft, Nontender, Nondistended; Bowel sounds present  EXTREMITIES: No cyanosis; no edema; no calf tenderness  NERVOUS SYSTEM:  Awake and alert; Oriented  to place, person and time ; no new deficits    _________________________________________________  LABS:                        13.0   7.7   )-----------( 210      ( 17 Sep 2017 06:59 )             37.7     09-17    144  |  109<H>  |  9   ----------------------------<  82  3.8   |  30  |  0.91    Ca    8.7      17 Sep 2017 06:59  Phos  2.9     09-17  Mg     2.3     09-17          CAPILLARY BLOOD GLUCOSE            RADIOLOGY & ADDITIONAL TESTS:    Imaging Personally Reviewed:  YES    Consultant(s) Notes Reviewed:   YES    Care Discussed with Consultants : YES    Plan of care was discussed with patient and /or primary care giver; all questions and concerns were addressed and care was aligned with patient's wishes.

## 2017-09-18 NOTE — DISCHARGE NOTE ADULT - PATIENT PORTAL LINK FT
“You can access the [facility] Patient Portal, offered by [facility], by registering with the following website: [email or web address]”

## 2017-09-18 NOTE — DISCHARGE NOTE ADULT - HOSPITAL COURSE
Patient is a 66/M with unknown PMHx, BIB EMS for complaint of LLQ pain, however Patient is very confused, unable to answer questions even with . Used  services: [id number], Mr Cordoba. Majority of Hx obtained from ED provider note. Patient is alert but not oriented to time/place and person. Patient knows his name, but nothing else. Apparently Patient is homeless and was found on the street by EMS. Per triage nurse, pt denies nausea, vomiting. However, pt denied any abdominal pain during examination, and was more concerned about his BP. No knowledge of pt's underlying mentation. Patient's states that  he live with his brother in law in the [place], has h/o hemorrhagic stroke in the past, no h/o seizure, does not know his brother in law's last name, only 1st name, Ivan, and does not know the number.    66 M poor historian with unknown PMHx BIB EMS for complaint of LLQ pain - ED workup included CT showing  shunt coursing over LLQ, cirrhotic liver, and UA w/ +ve LE and 6-10 WBC - admitted patient for workup of abdominal pain and encephalopathy - Patient medically clear for discharge - slight unsteadiness with ambulating noted w/ PT evaluation, psychiatry determined patient lacked medical capacity regarding his treatment - patient admitted to being homeless      Problem/Plan - 1:  ·  Problem: Mental status alteration.  Plan: AAOx2, otherwise mental status unreliable and inconsistent, CT negative for stroke  - SW consulted; possible plan for shelter vs long term placement  - Negative urine toxicology and blood alcohol levels  - Negative Hepatitis panel  PT recommendation; patient ambulatory w/ minimal assistance, pending   for placement.      Problem/Plan - 2:  ·  Problem: UTI (urinary tract infection).  Plan: Resolved.  UA w/ +ve LE and 6-10 WBC, no leukocytosis of fever overnight  - UCx 9/12; E. faecalis, less than 50K, treated w/ Rocephin  ID Dr. Fitzgerald; *** no UTI, no antibiotics needed - discontinued Rocephin.      Problem/Plan - 3:  ·  Problem: VICENTE (acute kidney injury).  Plan: Resolved.  BUN/Cr 25/1.54 - 16.23 at ED  - Repeat a.m. BMP 15/0.9; VICENTE resolved w/ IV rehydration  - Urine electrolytes FeNA - 0.4, prerenal.      Problem/Plan - 4:  ·  Problem: Prophylactic measure.  Plan: IMPROVE score 2 - DVT PPx w/ HSQ  No indication for GI stress ulcer PPx; patient tolerating diet.     Patient seen and examined at bedside with no acute complaints. The medical plan and results were discussed with the patient throughout the hospital course. Patient is medically clear for discharge and is advised to follow up with his primary care physician within a week for continued medical care. Patient is a 66/M with unknown PMHx, BIB EMS for complaint of LLQ pain, however Patient is very confused, unable to answer questions even with . Used  services: [id number], Mr Cordoba. Majority of Hx obtained from ED provider note. Patient is alert but not oriented to time/place and person. Patient knows his name, but nothing else. Apparently Patient is homeless and was found on the street by EMS. Per triage nurse, pt denies nausea, vomiting. However, pt denied any abdominal pain during examination, and was more concerned about his BP. No knowledge of pt's underlying mentation. Patient's states that  he live with his brother in law in the [place], has h/o hemorrhagic stroke in the past, no h/o seizure, does not know his brother in law's last name, only 1st name, Ivan, and does not know the number.    66 M poor historian with unknown PMHx BIB EMS for complaint of LLQ pain - ED workup included CT showing  shunt coursing over LLQ, cirrhotic liver, and UA w/ +ve LE and 6-10 WBC - admitted patient for workup of abdominal pain and encephalopathy - Patient medically clear for discharge - slight unsteadiness with ambulating noted w/ PT evaluation, psychiatry determined patient lacked medical capacity regarding his treatment - patient admitted to being homeless      Problem/Plan - 1:  ·  Problem: Mental status alteration.  Plan: AAOx2, otherwise mental status unreliable and inconsistent, CT negative for stroke  - SW consulted; possible plan for shelter vs long term placement  - Negative urine toxicology and blood alcohol levels  - Negative Hepatitis panel  PT recommendation; patient ambulatory w/ minimal assistance, pending   for placement.      Problem/Plan - 2:  ·  Problem: UTI (urinary tract infection).  Plan: Resolved.  UA w/ +ve LE and 6-10 WBC, no leukocytosis of fever overnight  - UCx 9/12; E. faecalis, less than 50K, treated w/ Rocephin  ID Dr. Fitzgerald; *** no UTI, no antibiotics needed - discontinued Rocephin.      Problem/Plan - 3:  ·  Problem: VICENTE (acute kidney injury).  Plan: Resolved.  BUN/Cr 25/1.54 - 16.23 at ED  - Repeat a.m. BMP 15/0.9; VICENTE resolved w/ IV rehydration  - Urine electrolytes FeNA - 0.4, prerenal.      Problem/Plan - 4:  ·  Problem: Prophylactic measure.  Plan: IMPROVE score 2 - DVT PPx w/ HSQ  No indication for GI stress ulcer PPx; patient tolerating diet.     Patient seen and examined at bedside with no acute complaints. Blood testing to evaluate etiology of altered mental status was negative for pathology except for a positive treponemal antibody test with negative RPR assay and RPR ratio < 1:1. Patient was instructed to follow up with outpatient neurologist for additional recommendations.   The medical plan and results were discussed with the patient throughout the hospital course. Patient is medically clear for discharge and is advised to follow up with his primary care physician within a week for continued medical care. Patient is a 66/M with unknown PMHx, BIB EMS for complaint of LLQ pain, however Patient is very confused, unable to answer questions even with . Used  services: [id number], Mr Cordoba. Majority of Hx obtained from ED provider note. Patient is alert but not oriented to time/place and person. Patient knows his name, but nothing else. Apparently Patient is homeless and was found on the street by EMS. No knowledge of pt's underlying mentation. Patient's states that  he live with his brother in law in the [place], has h/o hemorrhagic stroke in the past, no h/o seizure, does not know his brother in law's last name, only 1st name, Ivan, and does not know the number. ED workup included CT showing  shunt coursing over LLQ, cirrhotic liver, and UA w/ +ve LE and 6-10 WBC - admitted patient for workup of abdominal pain and encephalopathy - Patient medically clear for discharge - slight unsteadiness with ambulating noted w/ PT evaluation, psychiatry determined patient lacked medical capacity regarding his treatment - patient admitted to being homeless CT negative for stroke SW consulted; possible plan for shelter vs long term placement Negative urine toxicology and blood alcohol levelsNegative Hepatitis panel. Pt initially treated w/ Rocephin for UTI however was discontinued when urine cultures came back positive. Pt intially had acute kidney injury, however resolved with Iv hydrationBlood testing to evaluate etiology of altered mental status was negative for pathology except for a positive treponemal antibody test with negative RPR assay and RPR ratio < 1:1. Patient was instructed to follow up with outpatient neurologist for additional recommendations.     The medical plan and results were discussed with the patient throughout the hospital course. Patient is medically clear for discharge to home with nephew and brother and is advised to follow up with a primary care physician within 1-2 weeks for continued medical care. Discussed above with attending

## 2017-09-18 NOTE — DISCHARGE NOTE ADULT - CARE PLAN
Principal Discharge DX:	Mental status alteration  Goal:	.  Instructions for follow-up, activity and diet:	You were seen and examined for abdominal pain and your workup included a CT scan of the abdomen and pelvis which demonstrated a  shunt, cirrhotic liver, and a negative urinalaysis. You were seen and examined by the psychiatrist who determined that you lack the capacity making decision regarding treatment at the time. However your abdominal pain resolved and you are medically clear for discharge. Principal Discharge DX:	Mental status alteration  Goal:	.  Instructions for follow-up, activity and diet:	You were seen and examined for abdominal pain and your workup included a CT scan of the abdomen and pelvis which demonstrated a  shunt, cirrhotic liver, and a negative urinalaysis. You were seen and examined by the psychiatrist who determined that you lack the capacity making decision regarding treatment at the time. However your abdominal pain resolved and you are medically clear for discharge.  Secondary Diagnosis:	Syphilis  Instructions for follow-up, activity and diet:	We performed blood work to evaluate your altered mental status and testing revealed positive treponemal antibody and FTA testing with negative RPR assay and titer < 1:1. Please follow up with your outpatient neurologist for additional recommendations with regards to this finding. Principal Discharge DX:	Mental status alteration  Goal:	.  Instructions for follow-up, activity and diet:	You were seen and examined for abdominal pain and your workup included a CT scan of the abdomen and pelvis which demonstrated a  shunt, cirrhotic liver, and a negative urinalaysis. You were seen and examined by the psychiatrist who determined that you lack the capacity making decision regarding treatment at the time. However your abdominal pain resolved and you are medically clear for discharge.  Secondary Diagnosis:	Syphilis  Instructions for follow-up, activity and diet:	We performed blood work to evaluate your altered mental status and testing revealed positive treponemal antibody and FTA testing with negative RPR assay and titer < 1:1. Please follow up with your outpatient neurologist for additional recommendations with regards to this finding.  Secondary Diagnosis:	VICENTE (acute kidney injury)  Instructions for follow-up, activity and diet:	You came in with elevated BUN/Creatinine levels suggesting kidney injury. With intravenous fluids, your kidney levels improved. Encourage good oral hydration. Please follow up your BUN and Creatinine levels in one week with your primary care doctor.  Secondary Diagnosis:	UTI (urinary tract infection)  Instructions for follow-up, activity and diet:	You were initially treated with Rocephin antibiotic for UTI, however urine cultures were negative for bacterial growth, and the antibiotic was discontinued. Please follow up with a primary care doctor in one week.

## 2017-09-18 NOTE — DISCHARGE NOTE ADULT - PLAN OF CARE
. You were seen and examined for abdominal pain and your workup included a CT scan of the abdomen and pelvis which demonstrated a  shunt, cirrhotic liver, and a negative urinalaysis. You were seen and examined by the psychiatrist who determined that you lack the capacity making decision regarding treatment at the time. However your abdominal pain resolved and you are medically clear for discharge. We performed blood work to evaluate your altered mental status and testing revealed positive treponemal antibody and FTA testing with negative RPR assay and titer < 1:1. Please follow up with your outpatient neurologist for additional recommendations with regards to this finding. You came in with elevated BUN/Creatinine levels suggesting kidney injury. With intravenous fluids, your kidney levels improved. Encourage good oral hydration. Please follow up your BUN and Creatinine levels in one week with your primary care doctor. You were initially treated with Rocephin antibiotic for UTI, however urine cultures were negative for bacterial growth, and the antibiotic was discontinued. Please follow up with a primary care doctor in one week.

## 2017-09-19 RX ADMIN — HEPARIN SODIUM 5000 UNIT(S): 5000 INJECTION INTRAVENOUS; SUBCUTANEOUS at 05:23

## 2017-09-19 RX ADMIN — HEPARIN SODIUM 5000 UNIT(S): 5000 INJECTION INTRAVENOUS; SUBCUTANEOUS at 22:02

## 2017-09-19 RX ADMIN — HEPARIN SODIUM 5000 UNIT(S): 5000 INJECTION INTRAVENOUS; SUBCUTANEOUS at 13:36

## 2017-09-19 NOTE — DIETITIAN INITIAL EVALUATION ADULT. - OTHER INFO
nutrition assessment for length of stay; homeless PTA; skin intact; lacks capacity to make decision per Psychiatric consult; appetite good, usually 100% meal intake per flow sheet and report;  intervention noted, for shelter or long term care placement nutrition assessment for length of stay; homeless PTA; skin intact; lacks capacity to make decision per Psychiatric consult; appetite good, usually 100% meal intake per flow sheet and report;  intervention noted, for shelter or long term care placement.

## 2017-09-19 NOTE — DIETITIAN INITIAL EVALUATION ADULT. - PROBLEM SELECTOR PLAN 1
Patient is awake and alert but confused, unable to provide any info except his name  unable to obtain the name of his PCP, pharmacy, any PMHx, unknown indication of the  shunt  has leucocytosis with positive UA, cultures testing, will start IVF and start ceftriaxone ans AMS could be 2/2 to underlying infection  qSOFA:1, Cultures testing, Lactate WNL  Will get Social Work consult to obtain more info  Primary team to please obtain records for the  Shunt, any PMHx, medication list and complete Med rec  Patient currently does not have LLQ pain, CT A/P shows  shunt but no acute pathologies. Nodular liver, LFTs WNL  Will send hepatitis panel  Check Utox and Blood alcohol level

## 2017-09-19 NOTE — PROGRESS NOTE ADULT - SUBJECTIVE AND OBJECTIVE BOX
PGY 1 Note discussed with supervising resident and primary attending    Patient is a 66y old  Male who presents with a chief complaint of LLQ pain (18 Sep 2017 17:23)      INTERVAL HPI/OVERNIGHT EVENTS: Patient seen and examined at bedside with no new complaints    MEDICATIONS  (STANDING):  heparin  Injectable 5000 Unit(s) SubCutaneous every 8 hours  ergocalciferol 97098 Unit(s) Oral <User Schedule>    MEDICATIONS  (PRN):      __________________________________________________  REVIEW OF SYSTEMS:    CONSTITUTIONAL: No fever,   EYES: No acute visual disturbances  NECK: No pain or stiffness  RESPIRATORY: No cough; No shortness of breath  CARDIOVASCULAR: No chest pain, no palpitations  GASTROINTESTINAL: No pain. No nausea or vomiting; No diarrhea   NEUROLOGICAL: No headache or numbness, no tremors  MUSCULOSKELETAL: No joint pain, no muscle pain  GENITOURINARY: No dysuria, no frequency, no hesitancy  PSYCHIATRY: No depression , no anxiety  ALL OTHER  ROS negative        Vital Signs Last 24 Hrs  T(C): 36.8 (19 Sep 2017 05:41), Max: 36.9 (18 Sep 2017 20:30)  T(F): 98.2 (19 Sep 2017 05:41), Max: 98.4 (18 Sep 2017 20:30)  HR: 73 (19 Sep 2017 05:41) (59 - 74)  BP: 135/80 (19 Sep 2017 05:41) (104/66 - 144/62)  BP(mean): --  RR: 17 (19 Sep 2017 05:41) (17 - 18)  SpO2: 95% (19 Sep 2017 05:41) (95% - 96%)    ________________________________________________  PHYSICAL EXAM:  GENERAL: NAD  HEENT: Normocephalic;  conjunctivae and sclerae clear; moist mucous membranes;   NECK : supple  CHEST/LUNG: Clear to auscultation bilaterally with good air entry   HEART: S1 S2  regular; no murmurs, gallops or rubs  ABDOMEN: Soft, Nontender, Nondistended; Bowel sounds present  EXTREMITIES: No cyanosis; no edema; no calf tenderness  NERVOUS SYSTEM:  Awake and alert; Oriented  to place, person; no new deficits    _________________________________________________  LABS: None    RADIOLOGY & ADDITIONAL TESTS: None    Imaging Personally Reviewed:  None.    Consultant(s) Notes Reviewed:  None.    Care Discussed with Consultants : Yes,     Plan of care was discussed with patient and /or primary care giver; all questions and concerns were addressed and care was aligned with patient's wishes.

## 2017-09-20 LAB
ANION GAP SERPL CALC-SCNC: 5 MMOL/L — SIGNIFICANT CHANGE UP (ref 5–17)
BUN SERPL-MCNC: 12 MG/DL — SIGNIFICANT CHANGE UP (ref 7–18)
CALCIUM SERPL-MCNC: 9.2 MG/DL — SIGNIFICANT CHANGE UP (ref 8.4–10.5)
CHLORIDE SERPL-SCNC: 107 MMOL/L — SIGNIFICANT CHANGE UP (ref 96–108)
CO2 SERPL-SCNC: 28 MMOL/L — SIGNIFICANT CHANGE UP (ref 22–31)
CREAT SERPL-MCNC: 0.93 MG/DL — SIGNIFICANT CHANGE UP (ref 0.5–1.3)
GLUCOSE SERPL-MCNC: 81 MG/DL — SIGNIFICANT CHANGE UP (ref 70–99)
HCT VFR BLD CALC: 42.3 % — SIGNIFICANT CHANGE UP (ref 39–50)
HGB BLD-MCNC: 14.2 G/DL — SIGNIFICANT CHANGE UP (ref 13–17)
MCHC RBC-ENTMCNC: 29.6 PG — SIGNIFICANT CHANGE UP (ref 27–34)
MCHC RBC-ENTMCNC: 33.6 GM/DL — SIGNIFICANT CHANGE UP (ref 32–36)
MCV RBC AUTO: 88 FL — SIGNIFICANT CHANGE UP (ref 80–100)
PLATELET # BLD AUTO: 239 K/UL — SIGNIFICANT CHANGE UP (ref 150–400)
POTASSIUM SERPL-MCNC: 4.1 MMOL/L — SIGNIFICANT CHANGE UP (ref 3.5–5.3)
POTASSIUM SERPL-SCNC: 4.1 MMOL/L — SIGNIFICANT CHANGE UP (ref 3.5–5.3)
RBC # BLD: 4.8 M/UL — SIGNIFICANT CHANGE UP (ref 4.2–5.8)
RBC # FLD: 13.1 % — SIGNIFICANT CHANGE UP (ref 10.3–14.5)
SODIUM SERPL-SCNC: 140 MMOL/L — SIGNIFICANT CHANGE UP (ref 135–145)
WBC # BLD: 9 K/UL — SIGNIFICANT CHANGE UP (ref 3.8–10.5)
WBC # FLD AUTO: 9 K/UL — SIGNIFICANT CHANGE UP (ref 3.8–10.5)

## 2017-09-20 RX ADMIN — HEPARIN SODIUM 5000 UNIT(S): 5000 INJECTION INTRAVENOUS; SUBCUTANEOUS at 22:57

## 2017-09-20 RX ADMIN — HEPARIN SODIUM 5000 UNIT(S): 5000 INJECTION INTRAVENOUS; SUBCUTANEOUS at 13:38

## 2017-09-20 RX ADMIN — HEPARIN SODIUM 5000 UNIT(S): 5000 INJECTION INTRAVENOUS; SUBCUTANEOUS at 05:49

## 2017-09-20 NOTE — PROGRESS NOTE ADULT - SUBJECTIVE AND OBJECTIVE BOX
PGY 1 Note discussed with supervising resident and primary attending    Patient is a 66y old  Male who presents with a chief complaint of LLQ pain (18 Sep 2017 17:23)      INTERVAL HPI/OVERNIGHT EVENTS: Patient seen and examined at bedside with no new complaints, vitals stable, pending placement    MEDICATIONS  (STANDING):  heparin  Injectable 5000 Unit(s) SubCutaneous every 8 hours  ergocalciferol 61191 Unit(s) Oral <User Schedule>    MEDICATIONS  (PRN):      __________________________________________________  REVIEW OF SYSTEMS:    CONSTITUTIONAL: No fever,   EYES: No acute visual disturbances  NECK: No pain or stiffness  RESPIRATORY: No cough; No shortness of breath  CARDIOVASCULAR: No chest pain, no palpitations  GASTROINTESTINAL: No pain. No nausea or vomiting; No diarrhea   NEUROLOGICAL: No headache or numbness, no tremors  MUSCULOSKELETAL: No joint pain, no muscle pain  GENITOURINARY: No dysuria, no frequency, no hesitancy  PSYCHIATRY: No depression , no anxiety  ALL OTHER  ROS negative        Vital Signs Last 24 Hrs  T(C): 36.6 (20 Sep 2017 05:31), Max: 36.7 (19 Sep 2017 21:11)  T(F): 97.9 (20 Sep 2017 05:31), Max: 98 (19 Sep 2017 21:11)  HR: 65 (20 Sep 2017 05:31) (65 - 98)  BP: 147/79 (20 Sep 2017 05:31) (119/68 - 147/79)  BP(mean): --  RR: 16 (20 Sep 2017 05:31) (14 - 16)  SpO2: 95% (20 Sep 2017 05:31) (95% - 98%)    ________________________________________________  PHYSICAL EXAM:  GENERAL: NAD  HEENT: Normocephalic;  conjunctivae and sclerae clear; moist mucous membranes;   NECK : supple  CHEST/LUNG: Clear to auscultation bilaterally with good air entry   HEART: S1 S2  regular; no murmurs, gallops or rubs  ABDOMEN: Soft, Nontender, Nondistended; Bowel sounds present  EXTREMITIES: No cyanosis; no edema; no calf tenderness  NERVOUS SYSTEM:  Awake and alert; Oriented  to place, person and time ; no new deficits    _________________________________________________  LABS:                        14.2   9.0   )-----------( 239      ( 20 Sep 2017 06:13 )             42.3     09-20    140  |  107  |  12  ----------------------------<  81  4.1   |  28  |  0.93    Ca    9.2      20 Sep 2017 06:13          CAPILLARY BLOOD GLUCOSE            RADIOLOGY & ADDITIONAL TESTS:    Imaging Personally Reviewed:  YES    Consultant(s) Notes Reviewed:   YES    Care Discussed with Consultants : YES    Plan of care was discussed with patient and /or primary care giver; all questions and concerns were addressed and care was aligned with patient's wishes.

## 2017-09-21 DIAGNOSIS — F02.80 DEMENTIA IN OTHER DISEASES CLASSIFIED ELSEWHERE, UNSPECIFIED SEVERITY, WITHOUT BEHAVIORAL DISTURBANCE, PSYCHOTIC DISTURBANCE, MOOD DISTURBANCE, AND ANXIETY: ICD-10-CM

## 2017-09-21 PROCEDURE — 99233 SBSQ HOSP IP/OBS HIGH 50: CPT

## 2017-09-21 RX ADMIN — ERGOCALCIFEROL 50000 UNIT(S): 1.25 CAPSULE ORAL at 14:29

## 2017-09-21 RX ADMIN — HEPARIN SODIUM 5000 UNIT(S): 5000 INJECTION INTRAVENOUS; SUBCUTANEOUS at 06:05

## 2017-09-21 RX ADMIN — HEPARIN SODIUM 5000 UNIT(S): 5000 INJECTION INTRAVENOUS; SUBCUTANEOUS at 22:20

## 2017-09-21 RX ADMIN — HEPARIN SODIUM 5000 UNIT(S): 5000 INJECTION INTRAVENOUS; SUBCUTANEOUS at 14:29

## 2017-09-21 NOTE — PROGRESS NOTE BEHAVIORAL HEALTH - NSBHCHARTREVIEWIMAGING_PSY_A_CORE FT
EXAM:  CT BRAIN                            PROCEDURE DATE:  09/12/2017          INTERPRETATION:  CT HEAD WITHOUT CONTRAST    CLINICAL STATEMENT: abd pain, confusion.    COMPARISON: None available.    TECHNIQUE: Noncontrast axial CT head was obtained from the skull base to   vertex.    FINDINGS:  Left frontal approach ventriculoperitoneal shunt catheter tip terminates   in the third ventricle. No evidence of hydrocephalus. There is   hypodensity in the left frontal lobe which may be due to gliosis. An   embolization cortical mass is present in the region of the right MCA,   which causes streak artifact limiting evaluation.    There is no evidence of acute intracranial hemorrhage, mass effect or   midline shift. No CT evidence of acute large territory vascular infarct.   The ventricles and cortical sulci are prominent reflecting parenchymal   volume loss. Patchy hypodensities in the periventricular white matter are   nonspecific, but likely sequela of small vessel ischemic disease.   Intracranial atherosclerotic calcifications are present.    Chronic lacunar infarcts in the right thalamus and right basal ganglia.    The visualized paranasal sinuses are well aerated. Partial opacification   of the right inferior mastoid air cells.    IMPRESSION:  No acute intracranial hemorrhage, mass effect or midline shift.    Left-sided  shunt in place. No hydrocephalus.

## 2017-09-21 NOTE — PROGRESS NOTE BEHAVIORAL HEALTH - NSBHCHARTREVIEWLAB_PSY_A_CORE FT
14.2   9.0   )-----------( 239      ( 20 Sep 2017 06:13 )             42.3     09-20    140  |  107  |  12  ----------------------------<  81  4.1   |  28  |  0.93    Ca    9.2      20 Sep 2017 06:13        TSH    B12  Vitamin B12, Serum: 403 pg/mL (09-13-17 @ 09:43)    Folate  Folate, Serum: 7.5 ng/mL (09-13-17 @ 09:43)

## 2017-09-21 NOTE — PROGRESS NOTE BEHAVIORAL HEALTH - SUMMARY
66 M poor historian with unknown PMHx BIB EMS for complaint of LLQ pain - ED workup included CT showing  shunt coursing over LLQ, cirrhotic liver, and UA w/ +ve LE and 6-10 WBC - admitted patient for workup of abdominal pain and encephalopathy -  pt with h/o strokes and brain surgery as per pt. pt with cognitive impairment and memory loss that he says is chronic.  pt unable to provide information about family. no collateral available.   pt will need long term placement.

## 2017-09-21 NOTE — PROGRESS NOTE BEHAVIORAL HEALTH - NSBHCHARTREVIEWINVESTIGATE_PSY_A_CORE FT
Ventricular Rate 97 BPM    Atrial Rate 97 BPM    P-R Interval 174 ms    QRS Duration 86 ms     ms    QTc 457 ms    P Axis -13 degrees    R Axis -104 degrees    T Axis 56 degrees    Diagnosis Line Normal sinus rhythm  Right superior axis deviation  Right ventricular hypertrophy  Inferior infarct , age undetermined  Anterior infarct , age undetermined  Abnormal ECG

## 2017-09-21 NOTE — PROGRESS NOTE BEHAVIORAL HEALTH - NSBHCHARTREVIEWVS_PSY_A_CORE FT
Vital Signs Last 24 Hrs  T(C): 36.8 (21 Sep 2017 05:25), Max: 36.9 (20 Sep 2017 21:33)  T(F): 98.2 (21 Sep 2017 05:25), Max: 98.5 (20 Sep 2017 21:33)  HR: 77 (21 Sep 2017 10:06) (71 - 77)  BP: 122/70 (21 Sep 2017 10:06) (106/66 - 140/70)  BP(mean): --  RR: 18 (21 Sep 2017 05:25) (16 - 18)  SpO2: 98% (21 Sep 2017 10:06) (94% - 99%)

## 2017-09-21 NOTE — PROGRESS NOTE ADULT - SUBJECTIVE AND OBJECTIVE BOX
PGY 1 Note discussed with supervising resident and primary attending    Patient is a 66y old  Male who presents with a chief complaint of LLQ pain (18 Sep 2017 17:23)      INTERVAL HPI/OVERNIGHT EVENTS: Patient seen and examined at bedside with no new complaints, vital signs acceptable, PT examined patient and would need more sessions in patient, pending possible guardianship    MEDICATIONS  (STANDING):  heparin  Injectable 5000 Unit(s) SubCutaneous every 8 hours  ergocalciferol 80305 Unit(s) Oral <User Schedule>    MEDICATIONS  (PRN):      __________________________________________________  REVIEW OF SYSTEMS:    CONSTITUTIONAL: No fever,   EYES: No acute visual disturbances  NECK: No pain or stiffness  RESPIRATORY: No cough; No shortness of breath  CARDIOVASCULAR: No chest pain, no palpitations  GASTROINTESTINAL: No pain. No nausea or vomiting; No diarrhea   NEUROLOGICAL: No headache or numbness, no tremors  MUSCULOSKELETAL: No joint pain, no muscle pain  GENITOURINARY: No dysuria, no frequency, no hesitancy  PSYCHIATRY: No depression , no anxiety  ALL OTHER  ROS negative        Vital Signs Last 24 Hrs  T(C): 36.8 (21 Sep 2017 05:25), Max: 36.9 (20 Sep 2017 13:02)  T(F): 98.2 (21 Sep 2017 05:25), Max: 98.5 (20 Sep 2017 21:33)  HR: 77 (21 Sep 2017 10:06) (71 - 77)  BP: 122/70 (21 Sep 2017 10:06) (106/66 - 140/71)  BP(mean): --  RR: 18 (21 Sep 2017 05:25) (16 - 18)  SpO2: 98% (21 Sep 2017 10:06) (94% - 99%)    ________________________________________________  PHYSICAL EXAM:  GENERAL: NAD  HEENT: Normocephalic;  conjunctivae and sclerae clear; moist mucous membranes;   NECK : supple  CHEST/LUNG: Clear to auscultation bilaterally with good air entry   HEART: S1 S2  regular; no murmurs, gallops or rubs  ABDOMEN: Soft, Nontender, Nondistended; Bowel sounds present  EXTREMITIES: No cyanosis; no edema; no calf tenderness  NERVOUS SYSTEM:  Awake and alert; Oriented  to place, person and time ; no new deficits    _________________________________________________  LABS:                        14.2   9.0   )-----------( 239      ( 20 Sep 2017 06:13 )             42.3     09-20    140  |  107  |  12  ----------------------------<  81  4.1   |  28  |  0.93    Ca    9.2      20 Sep 2017 06:13          CAPILLARY BLOOD GLUCOSE            RADIOLOGY & ADDITIONAL TESTS:    Imaging Personally Reviewed:  YES    Consultant(s) Notes Reviewed:   YES    Care Discussed with Consultants : YES    Plan of care was discussed with patient and /or primary care giver; all questions and concerns were addressed and care was aligned with patient's wishes.

## 2017-09-21 NOTE — PROGRESS NOTE ADULT - PROBLEM SELECTOR PLAN 1
AAOx2, otherwise mental status unreliable and inconsistent, CT negative for stroke  - SW consulted; possible plan for shelter vs long term placement  - Negative urine toxicology and blood alcohol levels  - Negative Hepatitis panel  ***F/u PT recommendation; patient ambulatory w/ minimal assistance, pending  ***F/u  for placement AAOx2, otherwise mental status unreliable and inconsistent, CT negative for stroke  - SW consulted; possible plan for shelter vs long term placement  - Negative urine toxicology and blood alcohol levels  - Negative Hepatitis panel  ***F/u PT recommendation; patient ambulatory w/ minimal assistance, pending in progress  ***F/u  for placement, possible guardianship

## 2017-09-21 NOTE — PROGRESS NOTE BEHAVIORAL HEALTH - NSBHFUPINTERVALCCFT_PSY_A_CORE
pt is calm, saying he feels well. has good appetite. sleeping well. pt says he has memory problems for quite some time. says he has family but unable to give details form them. pt is able to give his social security number. knows his age, and where he is. was in senior living for 3 months and left 1 months ago aprox and has been homeless since then.

## 2017-09-22 LAB — TSH SERPL-MCNC: 1.35 UU/ML — SIGNIFICANT CHANGE UP (ref 0.34–4.82)

## 2017-09-22 RX ADMIN — HEPARIN SODIUM 5000 UNIT(S): 5000 INJECTION INTRAVENOUS; SUBCUTANEOUS at 21:25

## 2017-09-22 RX ADMIN — HEPARIN SODIUM 5000 UNIT(S): 5000 INJECTION INTRAVENOUS; SUBCUTANEOUS at 05:23

## 2017-09-22 RX ADMIN — HEPARIN SODIUM 5000 UNIT(S): 5000 INJECTION INTRAVENOUS; SUBCUTANEOUS at 13:20

## 2017-09-22 NOTE — PROGRESS NOTE ADULT - SUBJECTIVE AND OBJECTIVE BOX
HPI:  Patient is a 66/M with unknown PMHx, BIB EMS for complaint of LLQ pain, however Patient is very confused, unable to answer questions even with . Used  services: [id number], Mr Cordoba. Majority of Hx obtained from ED provider note. patient is alert but not ortiented to time/place and person.Pt knows his name, but nothing else. Apparently Patient is homeless and was found on the street by EMS. Per triage nurse, pt denies nausea, vomiting.However, pt denied any abdominal pain during examination, and was more concerned about his BP. No knowledge of pt's underlying mentation. Patient's states that  he live with his brother in law in the [place], has h/o hemorrhagic stroke in the past, no h/o seizure, does not know his brother in law's last name, only 1st name, Ivan. does not know the number. (12 Sep 2017 22:05)      Patient is a 66y old  Male who presents with a chief complaint of LLQ pain (18 Sep 2017 17:23)      INTERVAL HPI/OVERNIGHT EVENTS:  T(C): 36.7 (09-22-17 @ 14:17), Max: 36.9 (09-21-17 @ 21:58)  HR: 89 (09-22-17 @ 14:17) (68 - 89)  BP: 127/83 (09-22-17 @ 14:17) (115/75 - 142/72)  RR: 20 (09-22-17 @ 14:17) (16 - 20)  SpO2: 95% (09-22-17 @ 14:17) (95% - 97%)  Wt(kg): --  I&O's Summary      REVIEW OF SYSTEMS: denies fever, chills, SOB, palpitations, chest pain, abdominal pain, nausea, vomitting, diarrhea, constipation, dizziness    MEDICATIONS  (STANDING):  heparin  Injectable 5000 Unit(s) SubCutaneous every 8 hours  ergocalciferol 51825 Unit(s) Oral <User Schedule>    MEDICATIONS  (PRN):      PHYSICAL EXAM:  GENERAL: NAD, well-groomed, well-developed  HEAD:  Atraumatic, Normocephalic  EYES: EOMI, PERRLA, conjunctiva and sclera clear  ENMT: No tonsillar erythema, exudates, or enlargement; Moist mucous membranes, Good dentition, No lesions  NECK: Supple, No JVD, Normal thyroid  NERVOUS SYSTEM:  Alert & Oriented X3, Good concentration; Motor Strength 5/5 B/L upper and lower extremities; DTRs 2+ intact and symmetric  CHEST/LUNG: Clear to percussion bilaterally; No rales, rhonchi, wheezing, or rubs  HEART: Regular rate and rhythm; No murmurs, rubs, or gallops  ABDOMEN: Soft, Nontender, Nondistended; Bowel sounds present  EXTREMITIES:  2+ Peripheral Pulses, No clubbing, cyanosis, or edema  LYMPH: No lymphadenopathy noted  SKIN: No rashes or lesions  LABS:              CAPILLARY BLOOD GLUCOSE

## 2017-09-22 NOTE — PROGRESS NOTE ADULT - PROBLEM SELECTOR PLAN 1
AAOx2, otherwise mental status unreliable and inconsistent, CT negative for stroke  - Negative urine toxicology and blood alcohol levels  - Negative Hepatitis panel  *F/u PT recommendation; patient ambulatory w/ minimal assistance, pending in progress  *; SSN reported by patient - patient to be evaluated by [facility] for possible placement

## 2017-09-22 NOTE — PROGRESS NOTE ADULT - SUBJECTIVE AND OBJECTIVE BOX
PGY 1 Note discussed with supervising resident and primary attending    Patient is a 66y old  Male who presents with a chief complaint of LLQ pain (18 Sep 2017 17:23)      INTERVAL HPI/OVERNIGHT EVENTS: Patient seen and examined at bedside with no new complaints    MEDICATIONS  (STANDING):  heparin  Injectable 5000 Unit(s) SubCutaneous every 8 hours  ergocalciferol 96121 Unit(s) Oral <User Schedule>    MEDICATIONS  (PRN):      __________________________________________________  REVIEW OF SYSTEMS:    CONSTITUTIONAL: No fever,   EYES: No acute visual disturbances  NECK: No pain or stiffness  RESPIRATORY: No cough; No shortness of breath  CARDIOVASCULAR: No chest pain, no palpitations  GASTROINTESTINAL: No pain. No nausea or vomiting; No diarrhea   NEUROLOGICAL: No headache or numbness, no tremors  MUSCULOSKELETAL: No joint pain, no muscle pain  GENITOURINARY: No dysuria, no frequency, no hesitancy  PSYCHIATRY: No depression , no anxiety  ALL OTHER  ROS negative        Vital Signs Last 24 Hrs  T(C): 36.4 (22 Sep 2017 05:21), Max: 36.9 (21 Sep 2017 21:58)  T(F): 97.5 (22 Sep 2017 05:21), Max: 98.4 (21 Sep 2017 21:58)  HR: 68 (22 Sep 2017 05:21) (68 - 72)  BP: 142/72 (22 Sep 2017 05:21) (115/75 - 150/76)  BP(mean): --  RR: 18 (22 Sep 2017 05:21) (16 - 18)  SpO2: 96% (22 Sep 2017 05:21) (95% - 97%)    ________________________________________________  PHYSICAL EXAM:  GENERAL: NAD  HEENT: Normocephalic;  conjunctivae and sclerae clear; moist mucous membranes;   NECK : supple  CHEST/LUNG: Clear to auscultation bilaterally with good air entry   HEART: S1 S2  regular; no murmurs, gallops or rubs  ABDOMEN: Soft, Nontender, Nondistended; Bowel sounds present  EXTREMITIES: No cyanosis; no edema; no calf tenderness  NERVOUS SYSTEM:  Awake and alert; Oriented  to place, person and time ; no new deficits    _________________________________________________  LABS: NONE    RADIOLOGY & ADDITIONAL TESTS: NONE    Consultant(s) Notes Reviewed:   NO    Care Discussed with Consultants : NO    Plan of care was discussed with patient and /or primary care giver; all questions and concerns were addressed and care was aligned with patient's wishes.

## 2017-09-23 LAB — VIT B12 SERPL-MCNC: 505 PG/ML — SIGNIFICANT CHANGE UP (ref 243–894)

## 2017-09-23 RX ADMIN — HEPARIN SODIUM 5000 UNIT(S): 5000 INJECTION INTRAVENOUS; SUBCUTANEOUS at 15:54

## 2017-09-23 RX ADMIN — HEPARIN SODIUM 5000 UNIT(S): 5000 INJECTION INTRAVENOUS; SUBCUTANEOUS at 05:36

## 2017-09-23 RX ADMIN — HEPARIN SODIUM 5000 UNIT(S): 5000 INJECTION INTRAVENOUS; SUBCUTANEOUS at 21:24

## 2017-09-23 NOTE — PROGRESS NOTE ADULT - SUBJECTIVE AND OBJECTIVE BOX
HPI:  Patient is a 66/M with unknown PMHx, BIB EMS for complaint of LLQ pain, however Patient is very confused, unable to answer questions even with . Used  services: [id number], Mr Cordoba. Majority of Hx obtained from ED provider note. patient is alert but not ortiented to time/place and person.Pt knows his name, but nothing else. Apparently Patient is homeless and was found on the street by EMS. Per triage nurse, pt denies nausea, vomiting.However, pt denied any abdominal pain during examination, and was more concerned about his BP. No knowledge of pt's underlying mentation. Patient's states that  he live with his brother in law in the [place], has h/o hemorrhagic stroke in the past, no h/o seizure, does not know his brother in law's last name, only 1st name, Ivan. does not know the number. (12 Sep 2017 22:05)      Patient is a 66y old  Male who presents with a chief complaint of LLQ pain (18 Sep 2017 17:23)      INTERVAL HPI/OVERNIGHT EVENTS:  T(C): 36.3 (09-23-17 @ 14:32), Max: 36.6 (09-22-17 @ 21:25)  HR: 67 (09-23-17 @ 14:32) (67 - 90)  BP: 107/73 (09-23-17 @ 14:32) (107/73 - 126/76)  RR: 18 (09-23-17 @ 14:32) (18 - 18)  SpO2: 100% (09-23-17 @ 14:32) (95% - 100%)  Wt(kg): --  I&O's Summary      REVIEW OF SYSTEMS: denies fever, chills, SOB, palpitations, chest pain, abdominal pain, nausea, vomitting, diarrhea, constipation, dizziness    MEDICATIONS  (STANDING):  heparin  Injectable 5000 Unit(s) SubCutaneous every 8 hours  ergocalciferol 73676 Unit(s) Oral <User Schedule>    MEDICATIONS  (PRN):      PHYSICAL EXAM:  GENERAL: NAD, well-groomed, well-developed  HEAD:  Atraumatic, Normocephalic  EYES: EOMI, PERRLA, conjunctiva and sclera clear  ENMT: No tonsillar erythema, exudates, or enlargement; Moist mucous membranes, Good dentition, No lesions  NECK: Supple, No JVD, Normal thyroid  NERVOUS SYSTEM:  Alert & Oriented X3, Good concentration; Motor Strength 5/5 B/L upper and lower extremities; DTRs 2+ intact and symmetric  CHEST/LUNG: Clear to percussion bilaterally; No rales, rhonchi, wheezing, or rubs  HEART: Regular rate and rhythm; No murmurs, rubs, or gallops  ABDOMEN: Soft, Nontender, Nondistended; Bowel sounds present  EXTREMITIES:  2+ Peripheral Pulses, No clubbing, cyanosis, or edema  LYMPH: No lymphadenopathy noted  SKIN: No rashes or lesions  LABS:              CAPILLARY BLOOD GLUCOSE

## 2017-09-23 NOTE — PROGRESS NOTE ADULT - SUBJECTIVE AND OBJECTIVE BOX
PGY 1 Note discussed with supervising resident and primary attending    Patient is a 66y old  Male who presents with a chief complaint of LLQ pain (18 Sep 2017 17:23)      INTERVAL HPI/OVERNIGHT EVENTS: Patient seen and examined at bedside with no new complaints    MEDICATIONS  (STANDING):  heparin  Injectable 5000 Unit(s) SubCutaneous every 8 hours  ergocalciferol 27883 Unit(s) Oral <User Schedule>    MEDICATIONS  (PRN):      __________________________________________________  REVIEW OF SYSTEMS:    CONSTITUTIONAL: No fever,   EYES: No acute visual disturbances  NECK: No pain or stiffness  RESPIRATORY: No cough; No shortness of breath  CARDIOVASCULAR: No chest pain, no palpitations  GASTROINTESTINAL: No pain. No nausea or vomiting; No diarrhea   NEUROLOGICAL: No headache or numbness, no tremors  MUSCULOSKELETAL: No joint pain, no muscle pain  GENITOURINARY: No dysuria, no frequency, no hesitancy  PSYCHIATRY: No depression , no anxiety  ALL OTHER  ROS negative        Vital Signs Last 24 Hrs  T(C): 36.6 (23 Sep 2017 05:28), Max: 36.7 (22 Sep 2017 14:17)  T(F): 97.9 (23 Sep 2017 05:28), Max: 98.1 (22 Sep 2017 14:17)  HR: 68 (23 Sep 2017 05:28) (68 - 90)  BP: 126/76 (23 Sep 2017 05:28) (112/72 - 127/83)  BP(mean): --  RR: 18 (23 Sep 2017 05:28) (18 - 20)  SpO2: 98% (23 Sep 2017 05:28) (95% - 98%)    ________________________________________________  PHYSICAL EXAM:  GENERAL: NAD  HEENT: Normocephalic;  conjunctivae and sclerae clear; moist mucous membranes;   NECK : supple  CHEST/LUNG: Clear to auscultation bilaterally with good air entry   HEART: S1 S2  regular; no murmurs, gallops or rubs  ABDOMEN: Soft, Nontender, Nondistended; Bowel sounds present  EXTREMITIES: No cyanosis; no edema; no calf tenderness  NERVOUS SYSTEM:  Awake and alert; Oriented  to place, person and time ; no new deficits    _________________________________________________  LABS: none.    CAPILLARY BLOOD GLUCOSE none.    RADIOLOGY & ADDITIONAL TESTS: none.    Consultant(s) Notes Reviewed:   YES    Care Discussed with Consultants : YES    Plan of care was discussed with patient and /or primary care giver; all questions and concerns were addressed and care was aligned with patient's wishes. PGY 1 Note discussed with supervising resident and primary attending    Patient is a 66y old  Male who presents with a chief complaint of LLQ pain (18 Sep 2017 17:23)      INTERVAL HPI/OVERNIGHT EVENTS: Patient seen and examined at bedside with no new complaints, eating well and ambulating, pending placement    MEDICATIONS  (STANDING):  heparin  Injectable 5000 Unit(s) SubCutaneous every 8 hours  ergocalciferol 81716 Unit(s) Oral <User Schedule>    MEDICATIONS  (PRN):      __________________________________________________  REVIEW OF SYSTEMS:    CONSTITUTIONAL: No fever,   EYES: No acute visual disturbances  NECK: No pain or stiffness  RESPIRATORY: No cough; No shortness of breath  CARDIOVASCULAR: No chest pain, no palpitations  GASTROINTESTINAL: No pain. No nausea or vomiting; No diarrhea   NEUROLOGICAL: No headache or numbness, no tremors  MUSCULOSKELETAL: No joint pain, no muscle pain  GENITOURINARY: No dysuria, no frequency, no hesitancy  PSYCHIATRY: No depression , no anxiety  ALL OTHER  ROS negative        Vital Signs Last 24 Hrs  T(C): 36.6 (23 Sep 2017 05:28), Max: 36.7 (22 Sep 2017 14:17)  T(F): 97.9 (23 Sep 2017 05:28), Max: 98.1 (22 Sep 2017 14:17)  HR: 68 (23 Sep 2017 05:28) (68 - 90)  BP: 126/76 (23 Sep 2017 05:28) (112/72 - 127/83)  BP(mean): --  RR: 18 (23 Sep 2017 05:28) (18 - 20)  SpO2: 98% (23 Sep 2017 05:28) (95% - 98%)    ________________________________________________  PHYSICAL EXAM:  GENERAL: NAD  HEENT: Normocephalic;  conjunctivae and sclerae clear; moist mucous membranes;   NECK : supple  CHEST/LUNG: Clear to auscultation bilaterally with good air entry   HEART: S1 S2  regular; no murmurs, gallops or rubs  ABDOMEN: Soft, Nontender, Nondistended; Bowel sounds present  EXTREMITIES: No cyanosis; no edema; no calf tenderness  NERVOUS SYSTEM:  Awake and alert; Oriented  to place, person and time ; no new deficits    _________________________________________________  LABS: none.    CAPILLARY BLOOD GLUCOSE none.    RADIOLOGY & ADDITIONAL TESTS: none.    Consultant(s) Notes Reviewed:   YES    Care Discussed with Consultants : YES    Plan of care was discussed with patient and /or primary care giver; all questions and concerns were addressed and care was aligned with patient's wishes.

## 2017-09-24 LAB
RPR SER-TITR: SIGNIFICANT CHANGE UP
RPR SERPL-ACNC: SIGNIFICANT CHANGE UP
T PALLIDUM AB TITR SER: POSITIVE
T PALLIDUM IGG SER QL IF: REACTIVE

## 2017-09-24 RX ADMIN — HEPARIN SODIUM 5000 UNIT(S): 5000 INJECTION INTRAVENOUS; SUBCUTANEOUS at 21:23

## 2017-09-24 RX ADMIN — HEPARIN SODIUM 5000 UNIT(S): 5000 INJECTION INTRAVENOUS; SUBCUTANEOUS at 13:45

## 2017-09-24 RX ADMIN — HEPARIN SODIUM 5000 UNIT(S): 5000 INJECTION INTRAVENOUS; SUBCUTANEOUS at 05:54

## 2017-09-24 NOTE — PROGRESS NOTE ADULT - SUBJECTIVE AND OBJECTIVE BOX
HPI:  Patient is a 66/M with unknown PMHx, BIB EMS for complaint of LLQ pain, however Patient is very confused, unable to answer questions even with . Used  services: [id number], Mr Cordoba. Majority of Hx obtained from ED provider note. patient is alert but not ortiented to time/place and person.Pt knows his name, but nothing else. Apparently Patient is homeless and was found on the street by EMS. Per triage nurse, pt denies nausea, vomiting.However, pt denied any abdominal pain during examination, and was more concerned about his BP. No knowledge of pt's underlying mentation. Patient's states that  he live with his brother in law in the [place], has h/o hemorrhagic stroke in the past, no h/o seizure, does not know his brother in law's last name, only 1st name, Ivan. does not know the number. (12 Sep 2017 22:05)      Patient is a 66y old  Male who presents with a chief complaint of LLQ pain (18 Sep 2017 17:23)      INTERVAL HPI/OVERNIGHT EVENTS:  T(C): 36.7 (09-24-17 @ 14:22), Max: 36.7 (09-24-17 @ 14:22)  HR: 67 (09-24-17 @ 14:22) (67 - 78)  BP: 113/66 (09-24-17 @ 14:22) (109/56 - 131/76)  RR: 16 (09-24-17 @ 14:22) (16 - 18)  SpO2: 97% (09-24-17 @ 14:22) (95% - 98%)  Wt(kg): --  I&O's Summary      REVIEW OF SYSTEMS: denies fever, chills, SOB, palpitations, chest pain, abdominal pain, nausea, vomitting, diarrhea, constipation, dizziness    MEDICATIONS  (STANDING):  heparin  Injectable 5000 Unit(s) SubCutaneous every 8 hours  ergocalciferol 17600 Unit(s) Oral <User Schedule>    MEDICATIONS  (PRN):      PHYSICAL EXAM:  GENERAL: NAD, well-groomed, well-developed  HEAD:  Atraumatic, Normocephalic  EYES: EOMI, PERRLA, conjunctiva and sclera clear  ENMT: No tonsillar erythema, exudates, or enlargement; Moist mucous membranes, Good dentition, No lesions  NECK: Supple, No JVD, Normal thyroid  NERVOUS SYSTEM:  Alert & Oriented X3, Good concentration; Motor Strength 5/5 B/L upper and lower extremities; DTRs 2+ intact and symmetric  CHEST/LUNG: Clear to percussion bilaterally; No rales, rhonchi, wheezing, or rubs  HEART: Regular rate and rhythm; No murmurs, rubs, or gallops  ABDOMEN: Soft, Nontender, Nondistended; Bowel sounds present  EXTREMITIES:  2+ Peripheral Pulses, No clubbing, cyanosis, or edema  LYMPH: No lymphadenopathy noted  SKIN: No rashes or lesions  LABS:              CAPILLARY BLOOD GLUCOSE

## 2017-09-25 RX ORDER — PREGABALIN 225 MG/1
500 CAPSULE ORAL DAILY
Qty: 0 | Refills: 0 | Status: DISCONTINUED | OUTPATIENT
Start: 2017-09-25 | End: 2017-10-13

## 2017-09-25 RX ORDER — FOLIC ACID 0.8 MG
1 TABLET ORAL DAILY
Qty: 0 | Refills: 0 | Status: DISCONTINUED | OUTPATIENT
Start: 2017-09-25 | End: 2017-10-13

## 2017-09-25 RX ADMIN — Medication 1 TABLET(S): at 13:31

## 2017-09-25 RX ADMIN — HEPARIN SODIUM 5000 UNIT(S): 5000 INJECTION INTRAVENOUS; SUBCUTANEOUS at 13:31

## 2017-09-25 RX ADMIN — HEPARIN SODIUM 5000 UNIT(S): 5000 INJECTION INTRAVENOUS; SUBCUTANEOUS at 22:34

## 2017-09-25 RX ADMIN — HEPARIN SODIUM 5000 UNIT(S): 5000 INJECTION INTRAVENOUS; SUBCUTANEOUS at 05:52

## 2017-09-25 NOTE — PROGRESS NOTE ADULT - SUBJECTIVE AND OBJECTIVE BOX
CHIEF COMPLAINT:Patient is a 66y old  Male who presents with a chief complaint of LLQ pain (18 Sep 2017 17:23)    	          REVIEW OF SYSTEMS:  CONSTITUTIONAL: No fever, weight loss, or fatigue  EYES: No eye pain, visual disturbances, or discharge  NECK: No pain or stiffness  RESPIRATORY: No cough, wheezing, chills or hemoptysis; No Shortness of Breath  CARDIOVASCULAR: No chest pain, palpitations, passing out, dizziness, or leg swelling  GASTROINTESTINAL: No abdominal or epigastric pain. No nausea, vomiting, or hematemesis; No diarrhea or constipation. No melena or hematochezia.  GENITOURINARY: No dysuria, frequency, hematuria, or incontinence  NEUROLOGICAL: No headaches, memory loss, loss of strength, numbness, or tremors  SKIN: No itching, burning, rashes, or lesions   LYMPH Nodes: No enlarged glands  ENDOCRINE: No heat or cold intolerance; No hair loss  MUSCULOSKELETAL: No joint pain or swelling; No muscle, back, or extremity pain    Medications:  MEDICATIONS  (STANDING):  heparin  Injectable 5000 Unit(s) SubCutaneous every 8 hours  ergocalciferol 66557 Unit(s) Oral <User Schedule>  multivitamin 1 Tablet(s) Oral daily  cyanocobalamin 500 MICROGram(s) Oral daily  folic acid 1 milliGRAM(s) Oral daily    MEDICATIONS  (PRN):    	    PHYSICAL EXAM:  T(C): 36.9 (09-25-17 @ 05:36), Max: 36.9 (09-25-17 @ 05:36)  HR: 63 (09-25-17 @ 05:36) (63 - 67)  BP: 155/78 (09-25-17 @ 05:36) (113/66 - 155/78)  RR: 18 (09-25-17 @ 05:36) (16 - 18)  SpO2: 97% (09-25-17 @ 05:36) (97% - 100%)  Wt(kg): --  I&O's Summary      Appearance: Normal	  HEENT:   Normal oral mucosa, PERRL, EOMI	  Lymphatic: No lymphadenopathy  Cardiovascular: Normal S1 S2, No JVD, No murmurs, No edema  Respiratory: Lungs clear to auscultation	  Psychiatry: A & O x 3, Mood & affect appropriate  Gastrointestinal:  Soft, Non-tender, + BS	  Skin: No rashes, No ecchymoses, No cyanosis	  Neurologic: Non-focal  Extremities: Normal range of motion, No clubbing, cyanosis or edema  Vascular: Peripheral pulses palpable 2+ bilaterally    TELEMETRY: 	    ECG:  	  RADIOLOGY:  OTHER: 	  	  LABS:	 	    CARDIAC MARKERS:                  proBNP:   Lipid Profile:   HgA1c:   TSH:

## 2017-09-25 NOTE — PROGRESS NOTE ADULT - SUBJECTIVE AND OBJECTIVE BOX
PGY 1 Note discussed with supervising resident and primary attending    Patient is a 66y old  Male who presents with a chief complaint of LLQ pain (18 Sep 2017 17:23)      INTERVAL HPI/OVERNIGHT EVENTS: offers no new complaints; current symptoms resolving    MEDICATIONS  (STANDING):  heparin  Injectable 5000 Unit(s) SubCutaneous every 8 hours  ergocalciferol 58205 Unit(s) Oral <User Schedule>  multivitamin 1 Tablet(s) Oral daily    MEDICATIONS  (PRN):      __________________________________________________  REVIEW OF SYSTEMS:  CONSTITUTIONAL: No fever  NECK: No pain or stiffness  RESPIRATORY: No cough; No shortness of breath  CARDIOVASCULAR: No chest pain, no palpitations  GASTROINTESTINAL: No pain. No constipation, nausea or vomiting; No diarrhea   NEUROLOGICAL: No headache or numbness, no tremors  MUSCULOSKELETAL: No joint pain, no muscle pain  GENITOURINARY: no dysuria, no frequency, no hesitancy  PSYCHIATRY: no depression , no anxiety  ALL OTHER  ROS negative        Vital Signs Last 24 Hrs  T(C): 36.6 (24 Sep 2017 21:13), Max: 36.7 (24 Sep 2017 14:22)  T(F): 97.8 (24 Sep 2017 21:13), Max: 98.1 (24 Sep 2017 14:22)  HR: 66 (24 Sep 2017 21:13) (66 - 67)  BP: 133/75 (24 Sep 2017 21:13) (113/66 - 133/75)  BP(mean): --  RR: 16 (24 Sep 2017 21:13) (16 - 16)  SpO2: 100% (24 Sep 2017 21:13) (97% - 100%)    ________________________________________________  PHYSICAL EXAM:  GENERAL: NAD, lying in bed  HEENT: Normocephalic;  conjunctivae and sclerae clear; moist mucous membranes  NECK : supple, trachea midline, no JVD  CHEST/LUNG: Clear to auscultation bilaterally with good air entry   HEART: S1 S2,  regular rate and rhythm,; no murmurs, gallops or rubs  ABDOMEN: Soft, Nontender, Nondistended; Bowel sounds present  EXTREMITIES: no cyanosis; no edema; no calf tenderness  SKIN: warm and dry; no rash  NERVOUS SYSTEM:  AAOx3; no new focal deficits    _________________________________________________  LABS:              CAPILLARY BLOOD GLUCOSE          RADIOLOGY & ADDITIONAL TESTS:    Imaging Personally Reviewed:  YES    Consultant(s) Notes Reviewed:   YES    Care Discussed with Consultants:   Infectious Disease [] Neurology [] ENT [] Cardiology [] Electrophysiology [] Pulmonology [] Gastroenterology [] Nephrology [] Urology [] Orthopaedics [] Vascular Surgery [] Thoracic Surgery [] Plastic Surgery [] General Surgery [] Podiatry [] Psychiatry [] Hematology/Oncology [] Pain [] Palliative Care []    Plan of care was discussed with patient and/or primary care giver; all questions and concerns were addressed and care was aligned with patient's wishes. PGY 1 Note discussed with supervising resident and primary attending    Patient is a 66y old  Male who presents with a chief complaint of LLQ pain (18 Sep 2017 17:23)      INTERVAL HPI/OVERNIGHT EVENTS: offers no new complaints; current symptoms resolving    MEDICATIONS  (STANDING):  heparin  Injectable 5000 Unit(s) SubCutaneous every 8 hours  ergocalciferol 18365 Unit(s) Oral <User Schedule>  multivitamin 1 Tablet(s) Oral daily    MEDICATIONS  (PRN):      __________________________________________________  REVIEW OF SYSTEMS:  CONSTITUTIONAL: No fever  RESPIRATORY: No cough; No shortness of breath  CARDIOVASCULAR: No chest pain, no palpitations  GASTROINTESTINAL: No pain. No constipation, nausea or vomiting; No diarrhea   NEUROLOGICAL: No headache or numbness, no tremors  MUSCULOSKELETAL: No joint pain, no muscle pain  GENITOURINARY: no dysuria, no frequency, no hesitancy  ALL OTHER  ROS negative        Vital Signs Last 24 Hrs  T(C): 36.6 (24 Sep 2017 21:13), Max: 36.7 (24 Sep 2017 14:22)  T(F): 97.8 (24 Sep 2017 21:13), Max: 98.1 (24 Sep 2017 14:22)  HR: 66 (24 Sep 2017 21:13) (66 - 67)  BP: 133/75 (24 Sep 2017 21:13) (113/66 - 133/75)  BP(mean): --  RR: 16 (24 Sep 2017 21:13) (16 - 16)  SpO2: 100% (24 Sep 2017 21:13) (97% - 100%)    ________________________________________________  PHYSICAL EXAM:  GENERAL: NAD, lying in bed  NECK : supple, trachea midline, no JVD  CHEST/LUNG: Clear to auscultation bilaterally with good air entry   HEART: S1 S2,  regular rate and rhythm,; no murmurs, gallops or rubs  ABDOMEN: Soft, Nontender, Nondistended; Bowel sounds present  EXTREMITIES: no cyanosis; no edema; no calf tenderness  NERVOUS SYSTEM:  AAOx3; no new focal deficits    _________________________________________________  LABS:              CAPILLARY BLOOD GLUCOSE          RADIOLOGY & ADDITIONAL TESTS:    Imaging Personally Reviewed:  YES    Consultant(s) Notes Reviewed:   YES    Care Discussed with Consultants:   Infectious Disease [] Neurology [] ENT [] Cardiology [] Electrophysiology [] Pulmonology [] Gastroenterology [] Nephrology [] Urology [] Orthopaedics [] Vascular Surgery [] Thoracic Surgery [] Plastic Surgery [] General Surgery [] Podiatry [] Psychiatry [] Hematology/Oncology [] Pain [] Palliative Care []    Plan of care was discussed with patient and/or primary care giver; all questions and concerns were addressed and care was aligned with patient's wishes.

## 2017-09-25 NOTE — PROGRESS NOTE ADULT - PROBLEM SELECTOR PLAN 1
AAox2  U tox and blood EtOH levels negative  CT head negative for CVA  Social work planning for placement, potentially [facility]  F/U PT recommendation

## 2017-09-26 LAB
ANION GAP SERPL CALC-SCNC: 7 MMOL/L — SIGNIFICANT CHANGE UP (ref 5–17)
BUN SERPL-MCNC: 15 MG/DL — SIGNIFICANT CHANGE UP (ref 7–18)
CALCIUM SERPL-MCNC: 9.2 MG/DL — SIGNIFICANT CHANGE UP (ref 8.4–10.5)
CHLORIDE SERPL-SCNC: 107 MMOL/L — SIGNIFICANT CHANGE UP (ref 96–108)
CO2 SERPL-SCNC: 28 MMOL/L — SIGNIFICANT CHANGE UP (ref 22–31)
CREAT SERPL-MCNC: 1.11 MG/DL — SIGNIFICANT CHANGE UP (ref 0.5–1.3)
GLUCOSE SERPL-MCNC: 89 MG/DL — SIGNIFICANT CHANGE UP (ref 70–99)
POTASSIUM SERPL-MCNC: 4 MMOL/L — SIGNIFICANT CHANGE UP (ref 3.5–5.3)
POTASSIUM SERPL-SCNC: 4 MMOL/L — SIGNIFICANT CHANGE UP (ref 3.5–5.3)
SODIUM SERPL-SCNC: 142 MMOL/L — SIGNIFICANT CHANGE UP (ref 135–145)

## 2017-09-26 RX ADMIN — Medication 1 TABLET(S): at 12:00

## 2017-09-26 RX ADMIN — Medication 1 MILLIGRAM(S): at 12:00

## 2017-09-26 RX ADMIN — HEPARIN SODIUM 5000 UNIT(S): 5000 INJECTION INTRAVENOUS; SUBCUTANEOUS at 05:44

## 2017-09-26 RX ADMIN — PREGABALIN 500 MICROGRAM(S): 225 CAPSULE ORAL at 12:00

## 2017-09-26 RX ADMIN — HEPARIN SODIUM 5000 UNIT(S): 5000 INJECTION INTRAVENOUS; SUBCUTANEOUS at 21:59

## 2017-09-26 RX ADMIN — HEPARIN SODIUM 5000 UNIT(S): 5000 INJECTION INTRAVENOUS; SUBCUTANEOUS at 12:02

## 2017-09-26 NOTE — PROGRESS NOTE ADULT - PROBLEM SELECTOR PLAN 1
AAox2  U tox and blood EtOH levels negative  CT head negative for CVA  Social work planning for placement, potentially [facility]

## 2017-09-26 NOTE — PROGRESS NOTE ADULT - SUBJECTIVE AND OBJECTIVE BOX
PGY 1 Note discussed with supervising resident and primary attending    Patient is a 66y old  Male who presents with a chief complaint of LLQ pain (18 Sep 2017 17:23)      INTERVAL HPI/OVERNIGHT EVENTS: offers no new complaints; current symptoms resolving    MEDICATIONS  (STANDING):  heparin  Injectable 5000 Unit(s) SubCutaneous every 8 hours  ergocalciferol 46534 Unit(s) Oral <User Schedule>  multivitamin 1 Tablet(s) Oral daily  cyanocobalamin 500 MICROGram(s) Oral daily  folic acid 1 milliGRAM(s) Oral daily    MEDICATIONS  (PRN):      __________________________________________________  REVIEW OF SYSTEMS:  CONSTITUTIONAL: No fever  NECK: No pain or stiffness  RESPIRATORY: No cough; No shortness of breath  CARDIOVASCULAR: No chest pain, no palpitations  GASTROINTESTINAL: No pain. No constipation, nausea or vomiting; No diarrhea   NEUROLOGICAL: No headache or numbness, no tremors  MUSCULOSKELETAL: No joint pain, no muscle pain  GENITOURINARY: no dysuria, no frequency, no hesitancy  PSYCHIATRY: no depression , no anxiety  ALL OTHER  ROS negative        Vital Signs Last 24 Hrs  T(C): 36.4 (25 Sep 2017 21:23), Max: 36.9 (25 Sep 2017 05:36)  T(F): 97.6 (25 Sep 2017 21:23), Max: 98.4 (25 Sep 2017 05:36)  HR: 76 (25 Sep 2017 21:23) (63 - 79)  BP: 130/65 (25 Sep 2017 21:23) (130/65 - 155/78)  BP(mean): --  RR: 18 (25 Sep 2017 21:23) (16 - 18)  SpO2: 97% (25 Sep 2017 21:23) (97% - 98%)    ________________________________________________  PHYSICAL EXAM:  GENERAL: NAD, lying in bed  HEENT: Normocephalic;  conjunctivae and sclerae clear; moist mucous membranes  NECK : supple, trachea midline, no JVD  CHEST/LUNG: Clear to auscultation bilaterally with good air entry   HEART: S1 S2,  regular rate and rhythm,; no murmurs, gallops or rubs  ABDOMEN: Soft, Nontender, Nondistended; Bowel sounds present  EXTREMITIES: no cyanosis; no edema; no calf tenderness  SKIN: warm and dry; no rash  NERVOUS SYSTEM:  AAOx3; no new focal deficits    _________________________________________________  LABS:              CAPILLARY BLOOD GLUCOSE          RADIOLOGY & ADDITIONAL TESTS:    Imaging Personally Reviewed:  YES    Consultant(s) Notes Reviewed:   YES    Care Discussed with Consultants:   Infectious Disease [] Neurology [] ENT [] Cardiology [] Electrophysiology [] Pulmonology [] Gastroenterology [] Nephrology [] Urology [] Orthopaedics [] Vascular Surgery [] Thoracic Surgery [] Plastic Surgery [] General Surgery [] Podiatry [] Psychiatry [] Hematology/Oncology [] Pain [] Palliative Care []    Plan of care was discussed with patient and/or primary care giver; all questions and concerns were addressed and care was aligned with patient's wishes. PGY 1 Note discussed with supervising resident and primary attending    Patient is a 66y old  Male who presents with a chief complaint of LLQ pain (18 Sep 2017 17:23)      INTERVAL HPI/OVERNIGHT EVENTS: offers no new complaints; current symptoms resolving    MEDICATIONS  (STANDING):  heparin  Injectable 5000 Unit(s) SubCutaneous every 8 hours  ergocalciferol 45102 Unit(s) Oral <User Schedule>  multivitamin 1 Tablet(s) Oral daily  cyanocobalamin 500 MICROGram(s) Oral daily  folic acid 1 milliGRAM(s) Oral daily    MEDICATIONS  (PRN):      __________________________________________________  REVIEW OF SYSTEMS:  CONSTITUTIONAL: No fever  RESPIRATORY: No cough; No shortness of breath  CARDIOVASCULAR: No chest pain, no palpitations  GASTROINTESTINAL: No pain. No constipation, nausea or vomiting; No diarrhea   NEUROLOGICAL: No headache or numbness, no tremors  MUSCULOSKELETAL: No joint pain, no muscle pain  GENITOURINARY: no dysuria, no frequency, no hesitancy  ALL OTHER  ROS negative        Vital Signs Last 24 Hrs  T(C): 36.4 (25 Sep 2017 21:23), Max: 36.9 (25 Sep 2017 05:36)  T(F): 97.6 (25 Sep 2017 21:23), Max: 98.4 (25 Sep 2017 05:36)  HR: 76 (25 Sep 2017 21:23) (63 - 79)  BP: 130/65 (25 Sep 2017 21:23) (130/65 - 155/78)  BP(mean): --  RR: 18 (25 Sep 2017 21:23) (16 - 18)  SpO2: 97% (25 Sep 2017 21:23) (97% - 98%)    ________________________________________________  PHYSICAL EXAM:  GENERAL: NAD, lying in bed  NECK : supple, trachea midline, no JVD  CHEST/LUNG: Clear to auscultation bilaterally with good air entry   HEART: S1 S2,  regular rate and rhythm,; no murmurs, gallops or rubs  ABDOMEN: Soft, Nontender, Nondistended; Bowel sounds present  EXTREMITIES: no cyanosis; no edema; no calf tenderness  NERVOUS SYSTEM:  AAOx3; no new focal deficits    _________________________________________________  LABS:              CAPILLARY BLOOD GLUCOSE          RADIOLOGY & ADDITIONAL TESTS:    Imaging Personally Reviewed:  YES    Consultant(s) Notes Reviewed:   YES    Care Discussed with Consultants:   Infectious Disease [] Neurology [] ENT [] Cardiology [] Electrophysiology [] Pulmonology [] Gastroenterology [] Nephrology [] Urology [] Orthopaedics [] Vascular Surgery [] Thoracic Surgery [] Plastic Surgery [] General Surgery [] Podiatry [] Psychiatry [] Hematology/Oncology [] Pain [] Palliative Care []    Plan of care was discussed with patient and/or primary care giver; all questions and concerns were addressed and care was aligned with patient's wishes.

## 2017-09-27 RX ADMIN — HEPARIN SODIUM 5000 UNIT(S): 5000 INJECTION INTRAVENOUS; SUBCUTANEOUS at 05:51

## 2017-09-27 RX ADMIN — HEPARIN SODIUM 5000 UNIT(S): 5000 INJECTION INTRAVENOUS; SUBCUTANEOUS at 22:43

## 2017-09-27 RX ADMIN — Medication 1 MILLIGRAM(S): at 12:55

## 2017-09-27 RX ADMIN — Medication 1 TABLET(S): at 12:55

## 2017-09-27 RX ADMIN — PREGABALIN 500 MICROGRAM(S): 225 CAPSULE ORAL at 12:55

## 2017-09-27 RX ADMIN — HEPARIN SODIUM 5000 UNIT(S): 5000 INJECTION INTRAVENOUS; SUBCUTANEOUS at 14:13

## 2017-09-27 NOTE — PROGRESS NOTE ADULT - PROBLEM SELECTOR PLAN 1
AAox2  U tox and blood EtOH levels negative  CT head negative for CVA  Social work planning for placement, potentially [facility]

## 2017-09-27 NOTE — PROGRESS NOTE ADULT - SUBJECTIVE AND OBJECTIVE BOX
HPI:  Patient is a 66/M with unknown PMHx, BIB EMS for complaint of LLQ pain, however Patient is very confused, unable to answer questions even with . Used  services: [id number], Mr Cordoba. Majority of Hx obtained from ED provider note. patient is alert but not ortiented to time/place and person.Pt knows his name, but nothing else. Apparently Patient is homeless and was found on the street by EMS. Per triage nurse, pt denies nausea, vomiting.However, pt denied any abdominal pain during examination, and was more concerned about his BP. No knowledge of pt's underlying mentation. Patient's states that  he live with his brother in law in the [place], has h/o hemorrhagic stroke in the past, no h/o seizure, does not know his brother in law's last name, only 1st name, Ivan. does not know the number. (12 Sep 2017 22:05)      Patient is a 66y old  Male who presents with a chief complaint of LLQ pain (18 Sep 2017 17:23)      INTERVAL HPI/OVERNIGHT EVENTS:  T(C): 36.9 (09-27-17 @ 04:54), Max: 36.9 (09-27-17 @ 04:54)  HR: 65 (09-27-17 @ 04:54) (62 - 67)  BP: 132/66 (09-27-17 @ 04:54) (97/73 - 132/66)  RR: 16 (09-27-17 @ 04:54) (16 - 16)  SpO2: 94% (09-27-17 @ 04:54) (94% - 99%)  Wt(kg): --  I&O's Summary      REVIEW OF SYSTEMS: denies fever, chills, SOB, palpitations, chest pain, abdominal pain, nausea, vomitting, diarrhea, constipation, dizziness    MEDICATIONS  (STANDING):  heparin  Injectable 5000 Unit(s) SubCutaneous every 8 hours  ergocalciferol 52974 Unit(s) Oral <User Schedule>  multivitamin 1 Tablet(s) Oral daily  cyanocobalamin 500 MICROGram(s) Oral daily  folic acid 1 milliGRAM(s) Oral daily    MEDICATIONS  (PRN):      PHYSICAL EXAM:  GENERAL: NAD, well-groomed, well-developed  HEAD:  Atraumatic, Normocephalic  EYES: EOMI, PERRLA, conjunctiva and sclera clear  ENMT: No tonsillar erythema, exudates, or enlargement; Moist mucous membranes, Good dentition, No lesions  NECK: Supple, No JVD, Normal thyroid  NERVOUS SYSTEM:  Alert & Oriented X3, Good concentration; Motor Strength 5/5 B/L upper and lower extremities; DTRs 2+ intact and symmetric  CHEST/LUNG: Clear to percussion bilaterally; No rales, rhonchi, wheezing, or rubs  HEART: Regular rate and rhythm; No murmurs, rubs, or gallops  ABDOMEN: Soft, Nontender, Nondistended; Bowel sounds present  EXTREMITIES:  2+ Peripheral Pulses, No clubbing, cyanosis, or edema  LYMPH: No lymphadenopathy noted  SKIN: No rashes or lesions  LABS:    09-26    142  |  107  |  15  ----------------------------<  89  4.0   |  28  |  1.11    Ca    9.2      26 Sep 2017 06:35          CAPILLARY BLOOD GLUCOSE

## 2017-09-27 NOTE — PROGRESS NOTE ADULT - SUBJECTIVE AND OBJECTIVE BOX
PGY 1 Note discussed with supervising resident and primary attending    Patient is a 66y old  Male who presents with a chief complaint of LLQ pain (18 Sep 2017 17:23)      INTERVAL HPI/OVERNIGHT EVENTS: offers no new complaints; current symptoms resolving    MEDICATIONS  (STANDING):  heparin  Injectable 5000 Unit(s) SubCutaneous every 8 hours  ergocalciferol 42179 Unit(s) Oral <User Schedule>  multivitamin 1 Tablet(s) Oral daily  cyanocobalamin 500 MICROGram(s) Oral daily  folic acid 1 milliGRAM(s) Oral daily    MEDICATIONS  (PRN):      __________________________________________________  REVIEW OF SYSTEMS:  CONSTITUTIONAL: No fever  RESPIRATORY: No cough; No shortness of breath  CARDIOVASCULAR: No chest pain, no palpitations  GASTROINTESTINAL: No pain. No constipation, nausea or vomiting; No diarrhea   NEUROLOGICAL: No headache or numbness, no tremors  MUSCULOSKELETAL: No joint pain, no muscle pain  GENITOURINARY: no dysuria, no frequency, no hesitancy  ALL OTHER  ROS negative         Vital Signs Last 24 Hrs  T(C): 36.9 (27 Sep 2017 04:54), Max: 36.9 (27 Sep 2017 04:54)  T(F): 98.4 (27 Sep 2017 04:54), Max: 98.4 (27 Sep 2017 04:54)  HR: 65 (27 Sep 2017 04:54) (62 - 79)  BP: 132/66 (27 Sep 2017 04:54) (97/73 - 138/73)  BP(mean): --  RR: 16 (27 Sep 2017 04:54) (16 - 17)  SpO2: 94% (27 Sep 2017 04:54) (94% - 99%)    ________________________________________________  PHYSICAL EXAM:  GENERAL: NAD, lying in bed  NECK : supple, trachea midline, no JVD  CHEST/LUNG: Clear to auscultation bilaterally with good air entry   HEART: S1 S2,  regular rate and rhythm,; no murmurs, gallops or rubs  ABDOMEN: Soft, Nontender, Nondistended; Bowel sounds present  EXTREMITIES: no cyanosis; no edema; no calf tenderness  NERVOUS SYSTEM:  AAOx3; no new focal deficits    _________________________________________________  LABS:    09-26    142  |  107  |  15  ----------------------------<  89  4.0   |  28  |  1.11    Ca    9.2      26 Sep 2017 06:35          CAPILLARY BLOOD GLUCOSE          RADIOLOGY & ADDITIONAL TESTS:    Imaging Personally Reviewed:  YES    Consultant(s) Notes Reviewed:   YES    Care Discussed with Consultants:   Infectious Disease [] Endocrinology [] Neurology [] ENT [] Cardiology [] Electrophysiology [] Pulmonology [] Gastroenterology [] Nephrology [] Urology [] Orthopaedics [] Vascular Surgery [] Thoracic Surgery [] Plastic Surgery [] General Surgery [] Podiatry [] Psychiatry [] Hematology/Oncology [] Pain [] Palliative Care []    Plan of care was discussed with patient and/or primary care giver; all questions and concerns were addressed and care was aligned with patient's wishes.

## 2017-09-28 RX ADMIN — HEPARIN SODIUM 5000 UNIT(S): 5000 INJECTION INTRAVENOUS; SUBCUTANEOUS at 05:55

## 2017-09-28 RX ADMIN — HEPARIN SODIUM 5000 UNIT(S): 5000 INJECTION INTRAVENOUS; SUBCUTANEOUS at 17:37

## 2017-09-28 RX ADMIN — ERGOCALCIFEROL 50000 UNIT(S): 1.25 CAPSULE ORAL at 17:37

## 2017-09-28 RX ADMIN — Medication 1 TABLET(S): at 17:36

## 2017-09-28 RX ADMIN — Medication 1 MILLIGRAM(S): at 17:37

## 2017-09-28 RX ADMIN — PREGABALIN 500 MICROGRAM(S): 225 CAPSULE ORAL at 17:36

## 2017-09-28 RX ADMIN — HEPARIN SODIUM 5000 UNIT(S): 5000 INJECTION INTRAVENOUS; SUBCUTANEOUS at 21:14

## 2017-09-28 NOTE — PROGRESS NOTE ADULT - PROBLEM SELECTOR PLAN 1
AAox2  U tox and blood EtOH levels negative  CT head negative for CVA  Social work planning for placement, potentially [facility]

## 2017-09-28 NOTE — PROGRESS NOTE ADULT - SUBJECTIVE AND OBJECTIVE BOX
HPI:  Patient is a 66/M with unknown PMHx, BIB EMS for complaint of LLQ pain, however Patient is very confused, unable to answer questions even with . Used  services: [id number], Mr Cordoba. Majority of Hx obtained from ED provider note. patient is alert but not ortiented to time/place and person.Pt knows his name, but nothing else. Apparently Patient is homeless and was found on the street by EMS. Per triage nurse, pt denies nausea, vomiting.However, pt denied any abdominal pain during examination, and was more concerned about his BP. No knowledge of pt's underlying mentation. Patient's states that  he live with his brother in law in the [place], has h/o hemorrhagic stroke in the past, no h/o seizure, does not know his brother in law's last name, only 1st name, Ivan. does not know the number. (12 Sep 2017 22:05)      Patient is a 66y old  Male who presents with a chief complaint of LLQ pain (18 Sep 2017 17:23)      INTERVAL HPI/OVERNIGHT EVENTS:  T(C): 36.3 (09-28-17 @ 05:38), Max: 37.1 (09-27-17 @ 14:32)  HR: 72 (09-28-17 @ 05:38) (72 - 80)  BP: 135/80 (09-28-17 @ 05:38) (135/80 - 148/91)  RR: 18 (09-28-17 @ 05:38) (16 - 18)  SpO2: 97% (09-28-17 @ 05:38) (95% - 97%)  Wt(kg): --  I&O's Summary      REVIEW OF SYSTEMS: denies fever, chills, SOB, palpitations, chest pain, abdominal pain, nausea, vomitting, diarrhea, constipation, dizziness    MEDICATIONS  (STANDING):  heparin  Injectable 5000 Unit(s) SubCutaneous every 8 hours  ergocalciferol 08945 Unit(s) Oral <User Schedule>  multivitamin 1 Tablet(s) Oral daily  cyanocobalamin 500 MICROGram(s) Oral daily  folic acid 1 milliGRAM(s) Oral daily    MEDICATIONS  (PRN):      PHYSICAL EXAM:  GENERAL: NAD, well-groomed, well-developed  HEAD:  Atraumatic, Normocephalic  EYES: EOMI, PERRLA, conjunctiva and sclera clear  ENMT: No tonsillar erythema, exudates, or enlargement; Moist mucous membranes, Good dentition, No lesions  NECK: Supple, No JVD, Normal thyroid  NERVOUS SYSTEM:  Alert & Oriented X3, Good concentration; Motor Strength 5/5 B/L upper and lower extremities; DTRs 2+ intact and symmetric  CHEST/LUNG: Clear to percussion bilaterally; No rales, rhonchi, wheezing, or rubs  HEART: Regular rate and rhythm; No murmurs, rubs, or gallops  ABDOMEN: Soft, Nontender, Nondistended; Bowel sounds present  EXTREMITIES:  2+ Peripheral Pulses, No clubbing, cyanosis, or edema  LYMPH: No lymphadenopathy noted  SKIN: No rashes or lesions  LABS:              CAPILLARY BLOOD GLUCOSE

## 2017-09-28 NOTE — PROGRESS NOTE ADULT - SUBJECTIVE AND OBJECTIVE BOX
PGY 1 Note discussed with supervising resident and primary attending    Patient is a 66y old  Male who presents with a chief complaint of LLQ pain (18 Sep 2017 17:23)      INTERVAL HPI/OVERNIGHT EVENTS: offers no new complaints; current symptoms resolving    MEDICATIONS  (STANDING):  ergocalciferol 56512 Unit(s) Oral <User Schedule>  multivitamin 1 Tablet(s) Oral daily  cyanocobalamin 500 MICROGram(s) Oral daily  folic acid 1 milliGRAM(s) Oral daily  heparin  Injectable 5000 Unit(s) SubCutaneous every 8 hours    MEDICATIONS  (PRN):      __________________________________________________  REVIEW OF SYSTEMS:  CONSTITUTIONAL: No fever  NECK: No pain or stiffness  RESPIRATORY: No cough; No shortness of breath  CARDIOVASCULAR: No chest pain, no palpitations  GASTROINTESTINAL: No pain. No constipation, nausea or vomiting; No diarrhea   NEUROLOGICAL: No headache or numbness, no tremors  MUSCULOSKELETAL: No joint pain, no muscle pain  GENITOURINARY: no dysuria, no frequency, no hesitancy  PSYCHIATRY: no depression , no anxiety  ALL OTHER  ROS negative        Vital Signs Last 24 Hrs  T(C): 36.7 (27 Sep 2017 21:25), Max: 37.1 (27 Sep 2017 14:32)  T(F): 98.1 (27 Sep 2017 21:25), Max: 98.7 (27 Sep 2017 14:32)  HR: 80 (27 Sep 2017 21:25) (77 - 80)  BP: 148/91 (27 Sep 2017 21:25) (146/75 - 148/91)  BP(mean): --  RR: 18 (27 Sep 2017 21:25) (16 - 18)  SpO2: 96% (27 Sep 2017 21:25) (95% - 96%)    ________________________________________________  PHYSICAL EXAM:  GENERAL: NAD, lying in bed  HEENT: Normocephalic;  conjunctivae and sclerae clear; moist mucous membranes  NECK : supple, trachea midline, no JVD  CHEST/LUNG: Clear to auscultation bilaterally with good air entry   HEART: S1 S2,  regular rate and rhythm,; no murmurs, gallops or rubs  ABDOMEN: Soft, Nontender, Nondistended; Bowel sounds present  EXTREMITIES: no cyanosis; no edema; no calf tenderness  SKIN: warm and dry; no rash  NERVOUS SYSTEM:  AAOx3; no new focal deficits    _________________________________________________  LABS:    09-26    142  |  107  |  15  ----------------------------<  89  4.0   |  28  |  1.11    Ca    9.2      26 Sep 2017 06:35          CAPILLARY BLOOD GLUCOSE          RADIOLOGY & ADDITIONAL TESTS:    Imaging Personally Reviewed:  YES    Consultant(s) Notes Reviewed:   YES    Care Discussed with Consultants:   Infectious Disease [] Endocrinology [] Neurology [] ENT [] Cardiology [] Electrophysiology [] Pulmonology [] Gastroenterology [] Nephrology [] Urology [] Orthopaedics [] Vascular Surgery [] Thoracic Surgery [] Plastic Surgery [] General Surgery [] Podiatry [] Psychiatry [] Hematology/Oncology [] Pain [] Palliative Care []    Plan of care was discussed with patient and/or primary care giver; all questions and concerns were addressed and care was aligned with patient's wishes. PGY 1 Note discussed with supervising resident and primary attending    Patient is a 66y old  Male who presents with a chief complaint of LLQ pain (18 Sep 2017 17:23)      INTERVAL HPI/OVERNIGHT EVENTS: offers no new complaints; current symptoms resolving    MEDICATIONS  (STANDING):  ergocalciferol 29111 Unit(s) Oral <User Schedule>  multivitamin 1 Tablet(s) Oral daily  cyanocobalamin 500 MICROGram(s) Oral daily  folic acid 1 milliGRAM(s) Oral daily  heparin  Injectable 5000 Unit(s) SubCutaneous every 8 hours    MEDICATIONS  (PRN):      __________________________________________________  REVIEW OF SYSTEMS:  CONSTITUTIONAL: No fever  RESPIRATORY: No cough; No shortness of breath  CARDIOVASCULAR: No chest pain, no palpitations  GASTROINTESTINAL: No pain. No constipation, nausea or vomiting; No diarrhea   NEUROLOGICAL: No headache or numbness, no tremors  MUSCULOSKELETAL: No joint pain, no muscle pain  GENITOURINARY: no dysuria, no frequency, no hesitancy  ALL OTHER  ROS negative         Vital Signs Last 24 Hrs  T(C): 36.7 (27 Sep 2017 21:25), Max: 37.1 (27 Sep 2017 14:32)  T(F): 98.1 (27 Sep 2017 21:25), Max: 98.7 (27 Sep 2017 14:32)  HR: 80 (27 Sep 2017 21:25) (77 - 80)  BP: 148/91 (27 Sep 2017 21:25) (146/75 - 148/91)  BP(mean): --  RR: 18 (27 Sep 2017 21:25) (16 - 18)  SpO2: 96% (27 Sep 2017 21:25) (95% - 96%)    ________________________________________________  PHYSICAL EXAM:  GENERAL: NAD, lying in bed  NECK : supple, trachea midline, no JVD  CHEST/LUNG: Clear to auscultation bilaterally with good air entry   HEART: S1 S2,  regular rate and rhythm,; no murmurs, gallops or rubs  ABDOMEN: Soft, Nontender, Nondistended; Bowel sounds present  EXTREMITIES: no cyanosis; no edema; no calf tenderness  NERVOUS SYSTEM:  AAOx3; no new focal deficits    _________________________________________________  LABS:    09-26    142  |  107  |  15  ----------------------------<  89  4.0   |  28  |  1.11    Ca    9.2      26 Sep 2017 06:35          CAPILLARY BLOOD GLUCOSE          RADIOLOGY & ADDITIONAL TESTS:    Imaging Personally Reviewed:  YES    Consultant(s) Notes Reviewed:   YES    Care Discussed with Consultants:   Infectious Disease [] Endocrinology [] Neurology [] ENT [] Cardiology [] Electrophysiology [] Pulmonology [] Gastroenterology [] Nephrology [] Urology [] Orthopaedics [] Vascular Surgery [] Thoracic Surgery [] Plastic Surgery [] General Surgery [] Podiatry [] Psychiatry [] Hematology/Oncology [] Pain [] Palliative Care []    Plan of care was discussed with patient and/or primary care giver; all questions and concerns were addressed and care was aligned with patient's wishes.

## 2017-09-29 LAB
ANION GAP SERPL CALC-SCNC: 5 MMOL/L — SIGNIFICANT CHANGE UP (ref 5–17)
BUN SERPL-MCNC: 14 MG/DL — SIGNIFICANT CHANGE UP (ref 7–18)
CALCIUM SERPL-MCNC: 9.3 MG/DL — SIGNIFICANT CHANGE UP (ref 8.4–10.5)
CHLORIDE SERPL-SCNC: 107 MMOL/L — SIGNIFICANT CHANGE UP (ref 96–108)
CO2 SERPL-SCNC: 30 MMOL/L — SIGNIFICANT CHANGE UP (ref 22–31)
CREAT SERPL-MCNC: 0.91 MG/DL — SIGNIFICANT CHANGE UP (ref 0.5–1.3)
GLUCOSE SERPL-MCNC: 94 MG/DL — SIGNIFICANT CHANGE UP (ref 70–99)
HCT VFR BLD CALC: 42.4 % — SIGNIFICANT CHANGE UP (ref 39–50)
HGB BLD-MCNC: 14.4 G/DL — SIGNIFICANT CHANGE UP (ref 13–17)
MCHC RBC-ENTMCNC: 30.5 PG — SIGNIFICANT CHANGE UP (ref 27–34)
MCHC RBC-ENTMCNC: 34 GM/DL — SIGNIFICANT CHANGE UP (ref 32–36)
MCV RBC AUTO: 89.8 FL — SIGNIFICANT CHANGE UP (ref 80–100)
PLATELET # BLD AUTO: 193 K/UL — SIGNIFICANT CHANGE UP (ref 150–400)
POTASSIUM SERPL-MCNC: 4 MMOL/L — SIGNIFICANT CHANGE UP (ref 3.5–5.3)
POTASSIUM SERPL-SCNC: 4 MMOL/L — SIGNIFICANT CHANGE UP (ref 3.5–5.3)
RBC # BLD: 4.72 M/UL — SIGNIFICANT CHANGE UP (ref 4.2–5.8)
RBC # FLD: 12.9 % — SIGNIFICANT CHANGE UP (ref 10.3–14.5)
SODIUM SERPL-SCNC: 142 MMOL/L — SIGNIFICANT CHANGE UP (ref 135–145)
WBC # BLD: 8.2 K/UL — SIGNIFICANT CHANGE UP (ref 3.8–10.5)
WBC # FLD AUTO: 8.2 K/UL — SIGNIFICANT CHANGE UP (ref 3.8–10.5)

## 2017-09-29 RX ADMIN — HEPARIN SODIUM 5000 UNIT(S): 5000 INJECTION INTRAVENOUS; SUBCUTANEOUS at 05:27

## 2017-09-29 RX ADMIN — Medication 1 TABLET(S): at 12:32

## 2017-09-29 RX ADMIN — PREGABALIN 500 MICROGRAM(S): 225 CAPSULE ORAL at 12:32

## 2017-09-29 RX ADMIN — Medication 1 MILLIGRAM(S): at 12:32

## 2017-09-29 RX ADMIN — HEPARIN SODIUM 5000 UNIT(S): 5000 INJECTION INTRAVENOUS; SUBCUTANEOUS at 22:03

## 2017-09-29 RX ADMIN — HEPARIN SODIUM 5000 UNIT(S): 5000 INJECTION INTRAVENOUS; SUBCUTANEOUS at 14:38

## 2017-09-29 NOTE — PROGRESS NOTE ADULT - PROBLEM SELECTOR PLAN 1
AAox2  U tox and blood EtOH levels negative  CT head negative for CVA  Social work planning for placement, potentially [facility]

## 2017-09-29 NOTE — PROGRESS NOTE ADULT - SUBJECTIVE AND OBJECTIVE BOX
PGY 1 Note discussed with supervising resident and primary attending    Patient is a 66y old  Male who presents with a chief complaint of LLQ pain (18 Sep 2017 17:23)      INTERVAL HPI/OVERNIGHT EVENTS: offers no new complaints; current symptoms resolving    MEDICATIONS  (STANDING):  multivitamin 1 Tablet(s) Oral daily  cyanocobalamin 500 MICROGram(s) Oral daily  folic acid 1 milliGRAM(s) Oral daily  ergocalciferol 33423 Unit(s) Oral <User Schedule>  heparin  Injectable 5000 Unit(s) SubCutaneous every 8 hours    MEDICATIONS  (PRN):      __________________________________________________  REVIEW OF SYSTEMS:  CONSTITUTIONAL: No fever  NECK: No pain or stiffness  RESPIRATORY: No cough; No shortness of breath  CARDIOVASCULAR: No chest pain, no palpitations  GASTROINTESTINAL: No pain. No constipation, nausea or vomiting; No diarrhea   NEUROLOGICAL: No headache or numbness, no tremors  MUSCULOSKELETAL: No joint pain, no muscle pain  GENITOURINARY: no dysuria, no frequency, no hesitancy  PSYCHIATRY: no depression , no anxiety  ALL OTHER  ROS negative        Vital Signs Last 24 Hrs  T(C): 36.7 (28 Sep 2017 21:14), Max: 36.7 (28 Sep 2017 21:14)  T(F): 98 (28 Sep 2017 21:14), Max: 98 (28 Sep 2017 21:14)  HR: 65 (28 Sep 2017 21:14) (65 - 72)  BP: 137/74 (28 Sep 2017 21:14) (135/80 - 139/75)  BP(mean): --  RR: 16 (28 Sep 2017 21:14) (16 - 18)  SpO2: 97% (28 Sep 2017 21:14) (97% - 97%)    ________________________________________________  PHYSICAL EXAM:  GENERAL: NAD, lying in bed  HEENT: Normocephalic;  conjunctivae and sclerae clear; moist mucous membranes  NECK : supple, trachea midline, no JVD  CHEST/LUNG: Clear to auscultation bilaterally with good air entry   HEART: S1 S2,  regular rate and rhythm,; no murmurs, gallops or rubs  ABDOMEN: Soft, Nontender, Nondistended; Bowel sounds present  EXTREMITIES: no cyanosis; no edema; no calf tenderness  SKIN: warm and dry; no rash  NERVOUS SYSTEM:  AAOx3; no new focal deficits    _________________________________________________  LABS:              CAPILLARY BLOOD GLUCOSE          RADIOLOGY & ADDITIONAL TESTS:    Imaging Personally Reviewed:  YES    Consultant(s) Notes Reviewed:   YES    Care Discussed with Consultants:   Infectious Disease [] Endocrinology [] Neurology [] ENT [] Cardiology [] Electrophysiology [] Pulmonology [] Gastroenterology [] Nephrology [] Urology [] Orthopaedics [] Vascular Surgery [] Thoracic Surgery [] Plastic Surgery [] General Surgery [] Podiatry [] Psychiatry [] Hematology/Oncology [] Pain [] Palliative Care []    Plan of care was discussed with patient and/or primary care giver; all questions and concerns were addressed and care was aligned with patient's wishes. PGY 1 Note discussed with supervising resident and primary attending    Patient is a 66y old  Male who presents with a chief complaint of LLQ pain (18 Sep 2017 17:23)      INTERVAL HPI/OVERNIGHT EVENTS: offers no new complaints; current symptoms resolving    MEDICATIONS  (STANDING):  multivitamin 1 Tablet(s) Oral daily  cyanocobalamin 500 MICROGram(s) Oral daily  folic acid 1 milliGRAM(s) Oral daily  ergocalciferol 43943 Unit(s) Oral <User Schedule>  heparin  Injectable 5000 Unit(s) SubCutaneous every 8 hours    MEDICATIONS  (PRN):      __________________________________________________  REVIEW OF SYSTEMS:  CONSTITUTIONAL: No fever  RESPIRATORY: No cough; No shortness of breath  CARDIOVASCULAR: No chest pain, no palpitations  GASTROINTESTINAL: No pain. No constipation, nausea or vomiting; No diarrhea   NEUROLOGICAL: No headache or numbness, no tremors  MUSCULOSKELETAL: No joint pain, no muscle pain  GENITOURINARY: no dysuria, no frequency, no hesitancy  ALL OTHER  ROS negative        Vital Signs Last 24 Hrs  T(C): 36.7 (28 Sep 2017 21:14), Max: 36.7 (28 Sep 2017 21:14)  T(F): 98 (28 Sep 2017 21:14), Max: 98 (28 Sep 2017 21:14)  HR: 65 (28 Sep 2017 21:14) (65 - 72)  BP: 137/74 (28 Sep 2017 21:14) (135/80 - 139/75)  BP(mean): --  RR: 16 (28 Sep 2017 21:14) (16 - 18)  SpO2: 97% (28 Sep 2017 21:14) (97% - 97%)    ________________________________________________  PHYSICAL EXAM:  GENERAL: NAD, lying in bed  CHEST/LUNG: Clear to auscultation bilaterally with good air entry   HEART: S1 S2,  regular rate and rhythm,; no murmurs, gallops or rubs  ABDOMEN: Soft, Nontender, Nondistended; Bowel sounds present  EXTREMITIES: no cyanosis; no edema; no calf tenderness  SKIN: warm and dry; no rash  NERVOUS SYSTEM:  AAOx3; no new focal deficits    _________________________________________________  LABS:              CAPILLARY BLOOD GLUCOSE          RADIOLOGY & ADDITIONAL TESTS:    Imaging Personally Reviewed:  YES    Consultant(s) Notes Reviewed:   YES    Care Discussed with Consultants:   Infectious Disease [] Endocrinology [] Neurology [] ENT [] Cardiology [] Electrophysiology [] Pulmonology [] Gastroenterology [] Nephrology [] Urology [] Orthopaedics [] Vascular Surgery [] Thoracic Surgery [] Plastic Surgery [] General Surgery [] Podiatry [] Psychiatry [] Hematology/Oncology [] Pain [] Palliative Care []    Plan of care was discussed with patient and/or primary care giver; all questions and concerns were addressed and care was aligned with patient's wishes.

## 2017-09-30 RX ADMIN — HEPARIN SODIUM 5000 UNIT(S): 5000 INJECTION INTRAVENOUS; SUBCUTANEOUS at 21:29

## 2017-09-30 RX ADMIN — Medication 1 TABLET(S): at 12:13

## 2017-09-30 RX ADMIN — HEPARIN SODIUM 5000 UNIT(S): 5000 INJECTION INTRAVENOUS; SUBCUTANEOUS at 13:21

## 2017-09-30 RX ADMIN — HEPARIN SODIUM 5000 UNIT(S): 5000 INJECTION INTRAVENOUS; SUBCUTANEOUS at 05:09

## 2017-09-30 RX ADMIN — PREGABALIN 500 MICROGRAM(S): 225 CAPSULE ORAL at 12:13

## 2017-09-30 RX ADMIN — Medication 1 MILLIGRAM(S): at 12:13

## 2017-09-30 NOTE — PROGRESS NOTE ADULT - SUBJECTIVE AND OBJECTIVE BOX
Med attending follow up:    Patient is a 66y old  Male who presents with a chief complaint of LLQ pain (18 Sep 2017 17:23)      INTERVAL HPI/OVERNIGHT EVENTS: offers no new complaints; current symptoms resolving    MEDICATIONS  (STANDING):  multivitamin 1 Tablet(s) Oral daily  cyanocobalamin 500 MICROGram(s) Oral daily  folic acid 1 milliGRAM(s) Oral daily  ergocalciferol 23478 Unit(s) Oral <User Schedule>  heparin  Injectable 5000 Unit(s) SubCutaneous every 8 hours    MEDICATIONS  (PRN):      __________________________________________________  REVIEW OF SYSTEMS:  CONSTITUTIONAL: No fever  RESPIRATORY: No cough; No shortness of breath  CARDIOVASCULAR: No chest pain, no palpitations  GASTROINTESTINAL: No pain. No constipation, nausea or vomiting; No diarrhea   NEUROLOGICAL: No headache or numbness, no tremors  MUSCULOSKELETAL: No joint pain, no muscle pain  GENITOURINARY: no dysuria, no frequency, no hesitancy  ALL OTHER  ROS negative      Vital Signs Last 24 Hrs  T(C): 36.4 (30 Sep 2017 14:12), Max: 36.8 (29 Sep 2017 20:56)  T(F): 97.6 (30 Sep 2017 14:12), Max: 98.2 (29 Sep 2017 20:56)  HR: 69 (30 Sep 2017 14:12) (59 - 69)  BP: 137/78 (30 Sep 2017 14:12) (120/65 - 141/87)  BP(mean): --  RR: 16 (30 Sep 2017 14:12) (15 - 16)  SpO2: 99% (30 Sep 2017 14:12) (95% - 99%)    ________________________________________________  PHYSICAL EXAM:  GENERAL: NAD, lying in bed  CHEST/LUNG: Clear to auscultation bilaterally with good air entry   HEART: S1 S2,  regular rate and rhythm,; no murmurs, gallops or rubs  ABDOMEN: Soft, Nontender, Nondistended; Bowel sounds present  EXTREMITIES: no cyanosis; no edema; no calf tenderness  SKIN: warm and dry; no rash  NERVOUS SYSTEM:  AAOx3; no new focal deficits    _________________________________________________  LABS:              CAPILLARY BLOOD GLUCOSE          RADIOLOGY & ADDITIONAL TESTS:    Imaging Personally Reviewed:  YES    Consultant(s) Notes Reviewed:   YES    Care Discussed with Consultants:   Infectious Disease [] Endocrinology [] Neurology [] ENT [] Cardiology [] Electrophysiology [] Pulmonology [] Gastroenterology [] Nephrology [] Urology [] Orthopaedics [] Vascular Surgery [] Thoracic Surgery [] Plastic Surgery [] General Surgery [] Podiatry [] Psychiatry [] Hematology/Oncology [] Pain [] Palliative Care []    Plan of care was discussed with patient and/or primary care giver; all questions and concerns were addressed and care was aligned with patient's wishes.

## 2017-10-01 RX ADMIN — Medication 1 MILLIGRAM(S): at 17:24

## 2017-10-01 RX ADMIN — HEPARIN SODIUM 5000 UNIT(S): 5000 INJECTION INTRAVENOUS; SUBCUTANEOUS at 21:11

## 2017-10-01 RX ADMIN — Medication 1 TABLET(S): at 17:24

## 2017-10-01 RX ADMIN — PREGABALIN 500 MICROGRAM(S): 225 CAPSULE ORAL at 17:26

## 2017-10-01 RX ADMIN — HEPARIN SODIUM 5000 UNIT(S): 5000 INJECTION INTRAVENOUS; SUBCUTANEOUS at 17:24

## 2017-10-01 NOTE — PROGRESS NOTE ADULT - PROBLEM SELECTOR PLAN 1
AAox2  U tox and blood EtOH levels negative  CT head negative for CVA  Social work planning for placement due to homelessness; potentially [facility]

## 2017-10-01 NOTE — PROGRESS NOTE ADULT - SUBJECTIVE AND OBJECTIVE BOX
PGY 1 Note discussed with supervising resident and primary attending    Patient is a 66y old  Male who presents with a chief complaint of LLQ pain (18 Sep 2017 17:23)      INTERVAL HPI/OVERNIGHT EVENTS: offers no new complaints; current symptoms resolving    MEDICATIONS  (STANDING):  heparin  Injectable 5000 Unit(s) SubCutaneous every 8 hours  ergocalciferol 49091 Unit(s) Oral <User Schedule>  multivitamin 1 Tablet(s) Oral daily  cyanocobalamin 500 MICROGram(s) Oral daily  folic acid 1 milliGRAM(s) Oral daily    MEDICATIONS  (PRN):      __________________________________________________  REVIEW OF SYSTEMS:  CONSTITUTIONAL: No fever  NECK: No pain or stiffness  RESPIRATORY: No cough; No shortness of breath  CARDIOVASCULAR: No chest pain, no palpitations  GASTROINTESTINAL: No pain. No constipation, nausea or vomiting; No diarrhea   NEUROLOGICAL: No headache or numbness, no tremors  MUSCULOSKELETAL: No joint pain, no muscle pain  GENITOURINARY: no dysuria, no frequency, no hesitancy  PSYCHIATRY: no depression , no anxiety  ALL OTHER  ROS negative        Vital Signs Last 24 Hrs  T(C): 36.8 (01 Oct 2017 04:42), Max: 36.8 (30 Sep 2017 21:35)  T(F): 98.3 (01 Oct 2017 04:42), Max: 98.3 (30 Sep 2017 21:35)  HR: 64 (01 Oct 2017 04:42) (62 - 69)  BP: 145/76 (01 Oct 2017 04:42) (113/78 - 145/76)  BP(mean): --  RR: 18 (01 Oct 2017 04:42) (16 - 18)  SpO2: 95% (01 Oct 2017 04:42) (95% - 99%)    ________________________________________________  PHYSICAL EXAM:  GENERAL: NAD, lying in bed  HEENT: Normocephalic;  conjunctivae and sclerae clear; moist mucous membranes  NECK : supple, trachea midline, no JVD  CHEST/LUNG: Clear to auscultation bilaterally with good air entry   HEART: S1 S2,  regular rate and rhythm,; no murmurs, gallops or rubs  ABDOMEN: Soft, Nontender, Nondistended; Bowel sounds present  EXTREMITIES: no cyanosis; no edema; no calf tenderness  SKIN: warm and dry; no rash  NERVOUS SYSTEM:  AAOx3; no new focal deficits    _________________________________________________  LABS:                        14.4   8.2   )-----------( 193      ( 29 Sep 2017 06:07 )             42.4     09-29    142  |  107  |  14  ----------------------------<  94  4.0   |  30  |  0.91    Ca    9.3      29 Sep 2017 06:07          CAPILLARY BLOOD GLUCOSE          RADIOLOGY & ADDITIONAL TESTS:    Imaging Personally Reviewed:  YES    Consultant(s) Notes Reviewed:   YES    Care Discussed with Consultants:   Infectious Disease [] Endocrinology [] Neurology [] ENT [] Cardiology [] Electrophysiology [] Pulmonology [] Gastroenterology [] Nephrology [] Urology [] Orthopaedics [] Vascular Surgery [] Thoracic Surgery [] Plastic Surgery [] General Surgery [] Podiatry [] Psychiatry [] Hematology/Oncology [] Pain [] Palliative Care []    Plan of care was discussed with patient and/or primary care giver; all questions and concerns were addressed and care was aligned with patient's wishes. PGY 1 Note discussed with supervising resident and primary attending    Patient is a 66y old  Male who presents with a chief complaint of LLQ pain (18 Sep 2017 17:23)      INTERVAL HPI/OVERNIGHT EVENTS: offers no new complaints; current symptoms resolving    MEDICATIONS  (STANDING):  heparin  Injectable 5000 Unit(s) SubCutaneous every 8 hours  ergocalciferol 25417 Unit(s) Oral <User Schedule>  multivitamin 1 Tablet(s) Oral daily  cyanocobalamin 500 MICROGram(s) Oral daily  folic acid 1 milliGRAM(s) Oral daily    MEDICATIONS  (PRN):      __________________________________________________  REVIEW OF SYSTEMS:  CONSTITUTIONAL: No fever  RESPIRATORY: No cough; No shortness of breath  CARDIOVASCULAR: No chest pain, no palpitations  GASTROINTESTINAL: No pain. No constipation, nausea or vomiting; No diarrhea   NEUROLOGICAL: No headache or numbness, no tremors  MUSCULOSKELETAL: No joint pain, no muscle pain  GENITOURINARY: no dysuria, no frequency, no hesitancy  ALL OTHER  ROS negative        Vital Signs Last 24 Hrs  T(C): 36.8 (01 Oct 2017 04:42), Max: 36.8 (30 Sep 2017 21:35)  T(F): 98.3 (01 Oct 2017 04:42), Max: 98.3 (30 Sep 2017 21:35)  HR: 64 (01 Oct 2017 04:42) (62 - 69)  BP: 145/76 (01 Oct 2017 04:42) (113/78 - 145/76)  BP(mean): --  RR: 18 (01 Oct 2017 04:42) (16 - 18)  SpO2: 95% (01 Oct 2017 04:42) (95% - 99%)    ________________________________________________  PHYSICAL EXAM:  GENERAL: NAD, lying in bed  CHEST/LUNG: Clear to auscultation bilaterally with good air entry   HEART: S1 S2,  regular rate and rhythm,; no murmurs, gallops or rubs  ABDOMEN: Soft, Nontender, Nondistended; Bowel sounds present  EXTREMITIES: no cyanosis; no edema; no calf tenderness  NERVOUS SYSTEM:  AAOx3; no new focal deficits    _________________________________________________  LABS:                        14.4   8.2   )-----------( 193      ( 29 Sep 2017 06:07 )             42.4     09-29    142  |  107  |  14  ----------------------------<  94  4.0   |  30  |  0.91    Ca    9.3      29 Sep 2017 06:07          CAPILLARY BLOOD GLUCOSE          RADIOLOGY & ADDITIONAL TESTS:    Imaging Personally Reviewed:  YES    Consultant(s) Notes Reviewed:   YES    Care Discussed with Consultants:   Infectious Disease [] Endocrinology [] Neurology [] ENT [] Cardiology [] Electrophysiology [] Pulmonology [] Gastroenterology [] Nephrology [] Urology [] Orthopaedics [] Vascular Surgery [] Thoracic Surgery [] Plastic Surgery [] General Surgery [] Podiatry [] Psychiatry [] Hematology/Oncology [] Pain [] Palliative Care []    Plan of care was discussed with patient and/or primary care giver; all questions and concerns were addressed and care was aligned with patient's wishes.

## 2017-10-02 RX ADMIN — HEPARIN SODIUM 5000 UNIT(S): 5000 INJECTION INTRAVENOUS; SUBCUTANEOUS at 21:42

## 2017-10-02 RX ADMIN — HEPARIN SODIUM 5000 UNIT(S): 5000 INJECTION INTRAVENOUS; SUBCUTANEOUS at 05:59

## 2017-10-02 RX ADMIN — Medication 100 MILLIGRAM(S): at 21:45

## 2017-10-02 RX ADMIN — PREGABALIN 500 MICROGRAM(S): 225 CAPSULE ORAL at 12:39

## 2017-10-02 RX ADMIN — HEPARIN SODIUM 5000 UNIT(S): 5000 INJECTION INTRAVENOUS; SUBCUTANEOUS at 13:35

## 2017-10-02 RX ADMIN — Medication 1 TABLET(S): at 12:39

## 2017-10-02 RX ADMIN — Medication 1 MILLIGRAM(S): at 12:38

## 2017-10-02 NOTE — PROGRESS NOTE ADULT - PROBLEM SELECTOR PLAN 1
AAox2  U tox and blood EtOH levels negative  CT head negative for CVA  Social work planning for placement due to homelessness; potentially [facility]

## 2017-10-02 NOTE — PROGRESS NOTE ADULT - SUBJECTIVE AND OBJECTIVE BOX
PGY 1 Note discussed with supervising resident and primary attending    Patient is a 66y old  Male who presents with a chief complaint of LLQ pain (18 Sep 2017 17:23)      INTERVAL HPI/OVERNIGHT EVENTS: offers no new complaints; current symptoms resolving    MEDICATIONS  (STANDING):  heparin  Injectable 5000 Unit(s) SubCutaneous every 8 hours  ergocalciferol 41995 Unit(s) Oral <User Schedule>  multivitamin 1 Tablet(s) Oral daily  cyanocobalamin 500 MICROGram(s) Oral daily  folic acid 1 milliGRAM(s) Oral daily    MEDICATIONS  (PRN):  guaiFENesin    Syrup 100 milliGRAM(s) Oral every 6 hours PRN Cough      __________________________________________________  REVIEW OF SYSTEMS:  CONSTITUTIONAL: No fever  RESPIRATORY: No cough; No shortness of breath  CARDIOVASCULAR: No chest pain, no palpitations  GASTROINTESTINAL: No pain. No constipation, nausea or vomiting; No diarrhea   NEUROLOGICAL: No headache or numbness, no tremors  MUSCULOSKELETAL: No joint pain, no muscle pain  GENITOURINARY: no dysuria, no frequency, no hesitancy  ALL OTHER  ROS negative        Vital Signs Last 24 Hrs  T(C): 36.6 (01 Oct 2017 20:55), Max: 36.6 (01 Oct 2017 20:55)  T(F): 97.9 (01 Oct 2017 20:55), Max: 97.9 (01 Oct 2017 20:55)  HR: 65 (01 Oct 2017 20:55) (65 - 65)  BP: 135/73 (01 Oct 2017 20:55) (135/73 - 135/73)  BP(mean): --  RR: 16 (01 Oct 2017 20:55) (16 - 16)  SpO2: 99% (01 Oct 2017 20:55) (99% - 99%)    ________________________________________________  PHYSICAL EXAM:  GENERAL: NAD, lying in bed  CHEST/LUNG: Clear to auscultation bilaterally with good air entry   HEART: S1 S2,  regular rate and rhythm,; no murmurs, gallops or rubs  ABDOMEN: Soft, Nontender, Nondistended; Bowel sounds present  EXTREMITIES: no cyanosis; no edema; no calf tenderness  SKIN: warm and dry; no rash  NERVOUS SYSTEM:  AAOx3; no new focal deficits    _________________________________________________  LABS:              CAPILLARY BLOOD GLUCOSE          RADIOLOGY & ADDITIONAL TESTS:    Imaging Personally Reviewed:  YES    Consultant(s) Notes Reviewed:   YES    Care Discussed with Consultants:   Infectious Disease [] Endocrinology [] Neurology [] ENT [] Cardiology [] Electrophysiology [] Pulmonology [] Gastroenterology [] Nephrology [] Urology [] Orthopaedics [] Vascular Surgery [] Thoracic Surgery [] Plastic Surgery [] General Surgery [] Podiatry [] Psychiatry [] Hematology/Oncology [] Pain [] Palliative Care []    Plan of care was discussed with patient and/or primary care giver; all questions and concerns were addressed and care was aligned with patient's wishes.

## 2017-10-03 RX ORDER — LANOLIN ALCOHOL/MO/W.PET/CERES
3 CREAM (GRAM) TOPICAL AT BEDTIME
Qty: 0 | Refills: 0 | Status: COMPLETED | OUTPATIENT
Start: 2017-10-03 | End: 2017-10-05

## 2017-10-03 RX ADMIN — Medication 1 TABLET(S): at 12:42

## 2017-10-03 RX ADMIN — Medication 3 MILLIGRAM(S): at 21:40

## 2017-10-03 RX ADMIN — HEPARIN SODIUM 5000 UNIT(S): 5000 INJECTION INTRAVENOUS; SUBCUTANEOUS at 14:09

## 2017-10-03 RX ADMIN — Medication 3 MILLIGRAM(S): at 02:45

## 2017-10-03 RX ADMIN — HEPARIN SODIUM 5000 UNIT(S): 5000 INJECTION INTRAVENOUS; SUBCUTANEOUS at 21:39

## 2017-10-03 RX ADMIN — Medication 100 MILLIGRAM(S): at 05:55

## 2017-10-03 RX ADMIN — PREGABALIN 500 MICROGRAM(S): 225 CAPSULE ORAL at 12:43

## 2017-10-03 RX ADMIN — Medication 1 MILLIGRAM(S): at 12:43

## 2017-10-03 RX ADMIN — HEPARIN SODIUM 5000 UNIT(S): 5000 INJECTION INTRAVENOUS; SUBCUTANEOUS at 05:55

## 2017-10-03 NOTE — PROGRESS NOTE ADULT - SUBJECTIVE AND OBJECTIVE BOX
PGY 1 Note discussed with supervising resident and primary attending    Patient is a 66y old  Male who presents with a chief complaint of LLQ pain (18 Sep 2017 17:23)      INTERVAL HPI/OVERNIGHT EVENTS: offers no new complaints; current symptoms resolving    MEDICATIONS  (STANDING):  heparin  Injectable 5000 Unit(s) SubCutaneous every 8 hours  ergocalciferol 91927 Unit(s) Oral <User Schedule>  multivitamin 1 Tablet(s) Oral daily  cyanocobalamin 500 MICROGram(s) Oral daily  folic acid 1 milliGRAM(s) Oral daily  melatonin 3 milliGRAM(s) Oral at bedtime    MEDICATIONS  (PRN):  guaiFENesin    Syrup 100 milliGRAM(s) Oral every 6 hours PRN Cough      __________________________________________________  REVIEW OF SYSTEMS:  CONSTITUTIONAL: No fever  RESPIRATORY: No cough; No shortness of breath  CARDIOVASCULAR: No chest pain, no palpitations  GASTROINTESTINAL: No pain. No constipation, nausea or vomiting; No diarrhea   NEUROLOGICAL: No headache or numbness, no tremors  MUSCULOSKELETAL: No joint pain, no muscle pain  GENITOURINARY: no dysuria, no frequency, no hesitancy  ALL OTHER  ROS negative        Vital Signs Last 24 Hrs  T(C): 36.3 (03 Oct 2017 04:19), Max: 36.6 (02 Oct 2017 05:21)  T(F): 97.3 (03 Oct 2017 04:19), Max: 97.9 (02 Oct 2017 05:21)  HR: 66 (03 Oct 2017 04:19) (65 - 68)  BP: 129/72 (03 Oct 2017 04:19) (129/72 - 142/71)  BP(mean): --  RR: 17 (03 Oct 2017 04:19) (16 - 17)  SpO2: 94% (03 Oct 2017 04:19) (94% - 97%)    ________________________________________________  PHYSICAL EXAM:  GENERAL: NAD, lying in bed  CHEST/LUNG: Clear to auscultation bilaterally with good air entry   HEART: S1 S2,  regular rate and rhythm,; no murmurs, gallops or rubs  ABDOMEN: Soft, Nontender, Nondistended; Bowel sounds present  SKIN: warm and dry; no rash  NERVOUS SYSTEM:  AAOx3; no new focal deficits    _________________________________________________  LABS:              CAPILLARY BLOOD GLUCOSE          RADIOLOGY & ADDITIONAL TESTS:    Imaging Personally Reviewed:  YES    Consultant(s) Notes Reviewed:   YES    Care Discussed with Consultants:   Infectious Disease [] Endocrinology [] Neurology [] ENT [] Cardiology [] Electrophysiology [] Pulmonology [] Gastroenterology [] Nephrology [] Urology [] Orthopaedics [] Vascular Surgery [] Thoracic Surgery [] Plastic Surgery [] General Surgery [] Podiatry [] Psychiatry [] Hematology/Oncology [] Pain [] Palliative Care []    Plan of care was discussed with patient and/or primary care giver; all questions and concerns were addressed and care was aligned with patient's wishes.

## 2017-10-03 NOTE — PROGRESS NOTE ADULT - PROBLEM SELECTOR PLAN 1
AAox2  U tox and blood EtOH levels negative  CT head negative for CVA  Social work planning for placement due to homelessness; potentially [facility]

## 2017-10-04 RX ADMIN — HEPARIN SODIUM 5000 UNIT(S): 5000 INJECTION INTRAVENOUS; SUBCUTANEOUS at 13:17

## 2017-10-04 RX ADMIN — PREGABALIN 500 MICROGRAM(S): 225 CAPSULE ORAL at 12:17

## 2017-10-04 RX ADMIN — HEPARIN SODIUM 5000 UNIT(S): 5000 INJECTION INTRAVENOUS; SUBCUTANEOUS at 06:57

## 2017-10-04 RX ADMIN — Medication 1 TABLET(S): at 12:17

## 2017-10-04 RX ADMIN — HEPARIN SODIUM 5000 UNIT(S): 5000 INJECTION INTRAVENOUS; SUBCUTANEOUS at 21:22

## 2017-10-04 RX ADMIN — Medication 3 MILLIGRAM(S): at 21:22

## 2017-10-04 RX ADMIN — Medication 1 MILLIGRAM(S): at 12:17

## 2017-10-04 NOTE — PROGRESS NOTE ADULT - SUBJECTIVE AND OBJECTIVE BOX
PGY 1 Note discussed with supervising resident and primary attending    Patient is a 66y old  Male who presents with a chief complaint of LLQ pain (18 Sep 2017 17:23)      INTERVAL HPI/OVERNIGHT EVENTS: offers no new complaints; current symptoms resolving    MEDICATIONS  (STANDING):  cyanocobalamin 500 MICROGram(s) Oral daily  ergocalciferol 03688 Unit(s) Oral <User Schedule>  folic acid 1 milliGRAM(s) Oral daily  heparin  Injectable 5000 Unit(s) SubCutaneous every 8 hours  melatonin 3 milliGRAM(s) Oral at bedtime  multivitamin 1 Tablet(s) Oral daily    MEDICATIONS  (PRN):  guaiFENesin    Syrup 100 milliGRAM(s) Oral every 6 hours PRN Cough      __________________________________________________  REVIEW OF SYSTEMS:  CONSTITUTIONAL: No fever  RESPIRATORY: No cough; No shortness of breath  CARDIOVASCULAR: No chest pain, no palpitations  GASTROINTESTINAL: No pain. No constipation, nausea or vomiting; No diarrhea   NEUROLOGICAL: No headache or numbness, no tremors  GENITOURINARY: no dysuria, no frequency, no hesitancy  ALL OTHER  ROS negative        Vital Signs Last 24 Hrs  T(C): 36.6 (03 Oct 2017 21:00), Max: 36.6 (03 Oct 2017 14:44)  T(F): 97.8 (03 Oct 2017 21:00), Max: 97.8 (03 Oct 2017 14:44)  HR: 63 (03 Oct 2017 21:00) (63 - 64)  BP: 148/67 (03 Oct 2017 21:00) (132/66 - 148/67)  BP(mean): --  RR: 16 (03 Oct 2017 21:00) (16 - 16)  SpO2: 97% (03 Oct 2017 21:00) (97% - 97%)    ________________________________________________  PHYSICAL EXAM:  GENERAL: NAD, lying in bed  CHEST/LUNG: Clear to auscultation bilaterally with good air entry   HEART: S1 S2,  regular rate and rhythm,; no murmurs, gallops or rubs  ABDOMEN: Soft, Nontender, Nondistended; Bowel sounds present  NERVOUS SYSTEM:  AAOx3; no new focal deficits  _________________________________________________  LABS:              CAPILLARY BLOOD GLUCOSE          RADIOLOGY & ADDITIONAL TESTS:    Imaging Personally Reviewed:  YES    Consultant(s) Notes Reviewed:   YES    Care Discussed with Consultants:   Infectious Disease [] Endocrinology [] Neurology [] ENT [] Cardiology [] Electrophysiology [] Pulmonology [] Gastroenterology [] Nephrology [] Urology [] Orthopaedics [] Vascular Surgery [] Thoracic Surgery [] Plastic Surgery [] General Surgery [] Podiatry [] Psychiatry [] Hematology/Oncology [] Pain [] Palliative Care []    Plan of care was discussed with patient and/or primary care giver; all questions and concerns were addressed and care was aligned with patient's wishes.

## 2017-10-04 NOTE — PROGRESS NOTE ADULT - PROBLEM SELECTOR PLAN 1
AAox2  U tox and blood EtOH levels negative  CT head negative for CVA  Social work planning for placement due to homelessness; potentially [facility]

## 2017-10-05 RX ADMIN — HEPARIN SODIUM 5000 UNIT(S): 5000 INJECTION INTRAVENOUS; SUBCUTANEOUS at 13:16

## 2017-10-05 RX ADMIN — Medication 1 TABLET(S): at 11:43

## 2017-10-05 RX ADMIN — HEPARIN SODIUM 5000 UNIT(S): 5000 INJECTION INTRAVENOUS; SUBCUTANEOUS at 05:05

## 2017-10-05 RX ADMIN — PREGABALIN 500 MICROGRAM(S): 225 CAPSULE ORAL at 11:43

## 2017-10-05 RX ADMIN — HEPARIN SODIUM 5000 UNIT(S): 5000 INJECTION INTRAVENOUS; SUBCUTANEOUS at 23:46

## 2017-10-05 RX ADMIN — Medication 1 MILLIGRAM(S): at 11:43

## 2017-10-05 RX ADMIN — ERGOCALCIFEROL 50000 UNIT(S): 1.25 CAPSULE ORAL at 11:43

## 2017-10-05 RX ADMIN — Medication 3 MILLIGRAM(S): at 23:45

## 2017-10-05 NOTE — PROGRESS NOTE ADULT - SUBJECTIVE AND OBJECTIVE BOX
PGY 1 Note discussed with supervising resident and primary attending    Patient is a 66y old  Male who presents with a chief complaint of LLQ pain (18 Sep 2017 17:23)      INTERVAL HPI/OVERNIGHT EVENTS: offers no new complaints; current symptoms resolving    MEDICATIONS  (STANDING):  cyanocobalamin 500 MICROGram(s) Oral daily  ergocalciferol 50978 Unit(s) Oral <User Schedule>  folic acid 1 milliGRAM(s) Oral daily  heparin  Injectable 5000 Unit(s) SubCutaneous every 8 hours  melatonin 3 milliGRAM(s) Oral at bedtime  multivitamin 1 Tablet(s) Oral daily    MEDICATIONS  (PRN):  guaiFENesin    Syrup 100 milliGRAM(s) Oral every 6 hours PRN Cough      __________________________________________________  REVIEW OF SYSTEMS:  CONSTITUTIONAL: No fever  RESPIRATORY: No cough; No shortness of breath  CARDIOVASCULAR: No chest pain, no palpitations  GASTROINTESTINAL: No pain. No constipation, nausea or vomiting; No diarrhea   NEUROLOGICAL: No headache or numbness, no tremors  MUSCULOSKELETAL: No joint pain, no muscle pain  ALL OTHER  ROS negative        Vital Signs Last 24 Hrs  T(C): 36.6 (05 Oct 2017 04:55), Max: 36.8 (04 Oct 2017 21:12)  T(F): 97.8 (05 Oct 2017 04:55), Max: 98.3 (04 Oct 2017 21:12)  HR: 62 (05 Oct 2017 04:55) (59 - 85)  BP: 134/86 (05 Oct 2017 04:55) (122/69 - 156/78)  BP(mean): --  RR: 16 (05 Oct 2017 04:55) (16 - 17)  SpO2: 99% (05 Oct 2017 04:55) (98% - 99%)    ________________________________________________  PHYSICAL EXAM:  GENERAL: NAD, lying in bed  CHEST/LUNG: Clear to auscultation bilaterally with good air entry   HEART: S1 S2,  regular rate and rhythm,; no murmurs, gallops or rubs  ABDOMEN: Soft, Nontender, Nondistended; Bowel sounds present  EXTREMITIES: no cyanosis; no edema; no calf tenderness  SKIN: warm and dry; no rash  NERVOUS SYSTEM:  AAOx3; no new focal deficits    _________________________________________________  LABS:              CAPILLARY BLOOD GLUCOSE          RADIOLOGY & ADDITIONAL TESTS:    Imaging Personally Reviewed:  YES    Consultant(s) Notes Reviewed:   YES    Care Discussed with Consultants:   Infectious Disease [] Endocrinology [] Neurology [] ENT [] Cardiology [] Electrophysiology [] Pulmonology [] Gastroenterology [] Nephrology [] Urology [] Orthopaedics [] Vascular Surgery [] Thoracic Surgery [] Plastic Surgery [] General Surgery [] Podiatry [] Psychiatry [] Hematology/Oncology [] Pain [] Palliative Care []    Plan of care was discussed with patient and/or primary care giver; all questions and concerns were addressed and care was aligned with patient's wishes.

## 2017-10-05 NOTE — PROGRESS NOTE ADULT - PROBLEM SELECTOR PLAN 1
AAox2  U tox and blood EtOH levels negative  CT head negative for CVA  Social work planning for placement due to homelessness; potentially [facility]

## 2017-10-06 RX ORDER — HYDROCORTISONE 1 %
1 OINTMENT (GRAM) TOPICAL DAILY
Qty: 0 | Refills: 0 | Status: DISCONTINUED | OUTPATIENT
Start: 2017-10-06 | End: 2017-10-13

## 2017-10-06 RX ADMIN — HEPARIN SODIUM 5000 UNIT(S): 5000 INJECTION INTRAVENOUS; SUBCUTANEOUS at 05:52

## 2017-10-06 RX ADMIN — Medication 1 MILLIGRAM(S): at 11:19

## 2017-10-06 RX ADMIN — Medication 1 APPLICATION(S): at 15:45

## 2017-10-06 RX ADMIN — PREGABALIN 500 MICROGRAM(S): 225 CAPSULE ORAL at 11:19

## 2017-10-06 RX ADMIN — Medication 1 TABLET(S): at 11:19

## 2017-10-06 RX ADMIN — HEPARIN SODIUM 5000 UNIT(S): 5000 INJECTION INTRAVENOUS; SUBCUTANEOUS at 13:43

## 2017-10-06 NOTE — PROGRESS NOTE ADULT - PROBLEM SELECTOR PLAN 1
AAox2  U tox and blood EtOH levels negative  CT head negative for CVA  Social work planning for placement due to homelessness; potentially [facility]

## 2017-10-06 NOTE — PROGRESS NOTE ADULT - SUBJECTIVE AND OBJECTIVE BOX
PGY 1 Note discussed with supervising resident and primary attending    Patient is a 66y old  Male who presents with a chief complaint of LLQ pain (18 Sep 2017 17:23)      INTERVAL HPI/OVERNIGHT EVENTS: offers no new complaints; current symptoms resolving    MEDICATIONS  (STANDING):  cyanocobalamin 500 MICROGram(s) Oral daily  ergocalciferol 19444 Unit(s) Oral <User Schedule>  folic acid 1 milliGRAM(s) Oral daily  heparin  Injectable 5000 Unit(s) SubCutaneous every 8 hours  multivitamin 1 Tablet(s) Oral daily    MEDICATIONS  (PRN):  guaiFENesin    Syrup 100 milliGRAM(s) Oral every 6 hours PRN Cough      __________________________________________________  REVIEW OF SYSTEMS:  CONSTITUTIONAL: No fever  RESPIRATORY: No cough; No shortness of breath  CARDIOVASCULAR: No chest pain, no palpitations  GASTROINTESTINAL: No pain. No constipation, nausea or vomiting; No diarrhea   NEUROLOGICAL: No headache or numbness, no tremors  ALL OTHER  ROS negative        Vital Signs Last 24 Hrs  T(C): 36.4 (06 Oct 2017 05:07), Max: 36.6 (05 Oct 2017 14:32)  T(F): 97.5 (06 Oct 2017 05:07), Max: 97.8 (05 Oct 2017 14:32)  HR: 63 (06 Oct 2017 05:07) (56 - 63)  BP: 117/70 (06 Oct 2017 05:07) (111/63 - 128/69)  BP(mean): --  RR: 18 (06 Oct 2017 05:07) (16 - 18)  SpO2: 97% (06 Oct 2017 05:07) (97% - 100%)    ________________________________________________  PHYSICAL EXAM:  GENERAL: NAD, lying in bed  CHEST/LUNG: Clear to auscultation bilaterally with good air entry   HEART: S1 S2,  regular rate and rhythm,; no murmurs, gallops or rubs  ABDOMEN: Soft, Nontender, Nondistended; Bowel sounds present  EXTREMITIES: no cyanosis; no edema; no calf tenderness  NERVOUS SYSTEM:  AAOx3; no new focal deficits    _________________________________________________  LABS:              CAPILLARY BLOOD GLUCOSE          RADIOLOGY & ADDITIONAL TESTS:    Imaging Personally Reviewed:  YES    Consultant(s) Notes Reviewed:   YES    Care Discussed with Consultants:   Infectious Disease [] Endocrinology [] Neurology [] ENT [] Cardiology [] Electrophysiology [] Pulmonology [] Gastroenterology [] Nephrology [] Urology [] Orthopaedics [] Vascular Surgery [] Thoracic Surgery [] Plastic Surgery [] General Surgery [] Podiatry [] Psychiatry [] Hematology/Oncology [] Pain [] Palliative Care []    Plan of care was discussed with patient and/or primary care giver; all questions and concerns were addressed and care was aligned with patient's wishes.

## 2017-10-07 RX ADMIN — PREGABALIN 500 MICROGRAM(S): 225 CAPSULE ORAL at 11:26

## 2017-10-07 RX ADMIN — Medication 1 APPLICATION(S): at 11:26

## 2017-10-07 RX ADMIN — Medication 1 MILLIGRAM(S): at 11:26

## 2017-10-07 RX ADMIN — Medication 1 TABLET(S): at 11:26

## 2017-10-07 NOTE — PROGRESS NOTE ADULT - SUBJECTIVE AND OBJECTIVE BOX
Medical attending follow up:    Patient is a 66y old  Male who presents with a chief complaint of LLQ pain (18 Sep 2017 17:23)      INTERVAL HPI/OVERNIGHT EVENTS: offers no new complaints; current symptoms resolving    MEDICATIONS  (STANDING):  cyanocobalamin 500 MICROGram(s) Oral daily  ergocalciferol 34262 Unit(s) Oral <User Schedule>  folic acid 1 milliGRAM(s) Oral daily  heparin  Injectable 5000 Unit(s) SubCutaneous every 8 hours  multivitamin 1 Tablet(s) Oral daily    MEDICATIONS  (PRN):  guaiFENesin    Syrup 100 milliGRAM(s) Oral every 6 hours PRN Cough      __________________________________________________  REVIEW OF SYSTEMS:  CONSTITUTIONAL: No fever  RESPIRATORY: No cough; No shortness of breath  CARDIOVASCULAR: No chest pain, no palpitations  GASTROINTESTINAL: No pain. No constipation, nausea or vomiting; No diarrhea   NEUROLOGICAL: No headache or numbness, no tremors  ALL OTHER  ROS negative        Vital Signs Last 24 Hrs  T(C): 36.7 (07 Oct 2017 21:59), Max: 36.7 (07 Oct 2017 21:59)  T(F): 98.1 (07 Oct 2017 21:59), Max: 98.1 (07 Oct 2017 21:59)  HR: 88 (07 Oct 2017 21:59) (73 - 88)  BP: 145/86 (07 Oct 2017 21:59) (123/81 - 145/99)  BP(mean): --  RR: 18 (07 Oct 2017 21:59) (17 - 18)  SpO2: 97% (07 Oct 2017 21:59) (96% - 99%)    ________________________________________________  PHYSICAL EXAM:  GENERAL: NAD, lying in bed  CHEST/LUNG: Clear to auscultation bilaterally with good air entry   HEART: S1 S2,  regular rate and rhythm,; no murmurs, gallops or rubs  ABDOMEN: Soft, Nontender, Nondistended; Bowel sounds present  EXTREMITIES: no cyanosis; no edema; no calf tenderness  NERVOUS SYSTEM:  AAOx3; no new focal deficits    _________________________________________________  LABS:              CAPILLARY BLOOD GLUCOSE          RADIOLOGY & ADDITIONAL TESTS:    Imaging Personally Reviewed:  YES    Consultant(s) Notes Reviewed:   YES    Care Discussed with Consultants:   Infectious Disease [] Endocrinology [] Neurology [] ENT [] Cardiology [] Electrophysiology [] Pulmonology [] Gastroenterology [] Nephrology [] Urology [] Orthopaedics [] Vascular Surgery [] Thoracic Surgery [] Plastic Surgery [] General Surgery [] Podiatry [] Psychiatry [] Hematology/Oncology [] Pain [] Palliative Care []    Plan of care was discussed with patient and/or primary care giver; all questions and concerns were addressed and care was aligned with patient's wishes.

## 2017-10-08 RX ADMIN — Medication 1 TABLET(S): at 12:20

## 2017-10-08 RX ADMIN — PREGABALIN 500 MICROGRAM(S): 225 CAPSULE ORAL at 12:20

## 2017-10-08 RX ADMIN — Medication 1 APPLICATION(S): at 12:28

## 2017-10-08 RX ADMIN — Medication 1 MILLIGRAM(S): at 12:20

## 2017-10-08 NOTE — PROGRESS NOTE ADULT - ATTENDING COMMENTS
d/c planning,
no change, d/c planning going on, difficult discharge, social work, administration aware.
no change, d/c planning going on.
Agree with above.
agree with above, difficult discharge planning.
discharge planning going on.
no change, d/c planning going on.
Noted, interim events noted, agree with management.
Patient seen and examined, above noted, agree with management.
covering for Dr cagle  abd pain resolved,  cont same

## 2017-10-08 NOTE — PROGRESS NOTE ADULT - SUBJECTIVE AND OBJECTIVE BOX
Medical attending follow up:    Patient is a 66y old  Male who presents with a chief complaint of LLQ pain (18 Sep 2017 17:23)  No pain.    INTERVAL HPI/OVERNIGHT EVENTS: offers no new complaints; current symptoms resolving    MEDICATIONS  (STANDING):  cyanocobalamin 500 MICROGram(s) Oral daily  ergocalciferol 46945 Unit(s) Oral <User Schedule>  folic acid 1 milliGRAM(s) Oral daily  heparin  Injectable 5000 Unit(s) SubCutaneous every 8 hours  multivitamin 1 Tablet(s) Oral daily    MEDICATIONS  (PRN):  guaiFENesin    Syrup 100 milliGRAM(s) Oral every 6 hours PRN Cough      __________________________________________________  REVIEW OF SYSTEMS:  CONSTITUTIONAL: No fever  RESPIRATORY: No cough; No shortness of breath  CARDIOVASCULAR: No chest pain, no palpitations  GASTROINTESTINAL: No pain. No constipation, nausea or vomiting; No diarrhea   NEUROLOGICAL: No headache or numbness, no tremors  ALL OTHER  ROS negative        Vital Signs Last 24 Hrs  T(C): 36.6 (08 Oct 2017 05:43), Max: 36.7 (07 Oct 2017 21:59)  T(F): 97.8 (08 Oct 2017 05:43), Max: 98.1 (07 Oct 2017 21:59)  HR: 65 (08 Oct 2017 06:34) (65 - 88)  BP: 152/88 (08 Oct 2017 06:34) (123/81 - 183/87)  BP(mean): --  RR: 17 (08 Oct 2017 06:34) (17 - 18)  SpO2: 97% (08 Oct 2017 06:34) (96% - 99%)    ________________________________________________  PHYSICAL EXAM:  GENERAL: NAD, lying in bed  CHEST/LUNG: Clear to auscultation bilaterally with good air entry   HEART: S1 S2,  regular rate and rhythm,; no murmurs, gallops or rubs  ABDOMEN: Soft, Nontender, Nondistended; Bowel sounds present  EXTREMITIES: no cyanosis; no edema; no calf tenderness  NERVOUS SYSTEM:  AAOx3; no new focal deficits    _________________________________________________  LABS:              CAPILLARY BLOOD GLUCOSE          RADIOLOGY & ADDITIONAL TESTS:    Imaging Personally Reviewed:  YES    Consultant(s) Notes Reviewed:   YES    Care Discussed with Consultants:   Infectious Disease [] Endocrinology [] Neurology [] ENT [] Cardiology [] Electrophysiology [] Pulmonology [] Gastroenterology [] Nephrology [] Urology [] Orthopaedics [] Vascular Surgery [] Thoracic Surgery [] Plastic Surgery [] General Surgery [] Podiatry [] Psychiatry [] Hematology/Oncology [] Pain [] Palliative Care []    Plan of care was discussed with patient and/or primary care giver; all questions and concerns were addressed and care was aligned with patient's wishes.

## 2017-10-09 LAB
ANION GAP SERPL CALC-SCNC: 7 MMOL/L — SIGNIFICANT CHANGE UP (ref 5–17)
BUN SERPL-MCNC: 16 MG/DL — SIGNIFICANT CHANGE UP (ref 7–18)
CALCIUM SERPL-MCNC: 9.4 MG/DL — SIGNIFICANT CHANGE UP (ref 8.4–10.5)
CHLORIDE SERPL-SCNC: 105 MMOL/L — SIGNIFICANT CHANGE UP (ref 96–108)
CO2 SERPL-SCNC: 28 MMOL/L — SIGNIFICANT CHANGE UP (ref 22–31)
CREAT SERPL-MCNC: 1.02 MG/DL — SIGNIFICANT CHANGE UP (ref 0.5–1.3)
GLUCOSE SERPL-MCNC: 98 MG/DL — SIGNIFICANT CHANGE UP (ref 70–99)
HCT VFR BLD CALC: 46.6 % — SIGNIFICANT CHANGE UP (ref 39–50)
HGB BLD-MCNC: 15.8 G/DL — SIGNIFICANT CHANGE UP (ref 13–17)
MCHC RBC-ENTMCNC: 31 PG — SIGNIFICANT CHANGE UP (ref 27–34)
MCHC RBC-ENTMCNC: 34 GM/DL — SIGNIFICANT CHANGE UP (ref 32–36)
MCV RBC AUTO: 91.3 FL — SIGNIFICANT CHANGE UP (ref 80–100)
PLATELET # BLD AUTO: 192 K/UL — SIGNIFICANT CHANGE UP (ref 150–400)
POTASSIUM SERPL-MCNC: 3.9 MMOL/L — SIGNIFICANT CHANGE UP (ref 3.5–5.3)
POTASSIUM SERPL-SCNC: 3.9 MMOL/L — SIGNIFICANT CHANGE UP (ref 3.5–5.3)
RBC # BLD: 5.1 M/UL — SIGNIFICANT CHANGE UP (ref 4.2–5.8)
RBC # FLD: 13 % — SIGNIFICANT CHANGE UP (ref 10.3–14.5)
SODIUM SERPL-SCNC: 140 MMOL/L — SIGNIFICANT CHANGE UP (ref 135–145)
WBC # BLD: 9.9 K/UL — SIGNIFICANT CHANGE UP (ref 3.8–10.5)
WBC # FLD AUTO: 9.9 K/UL — SIGNIFICANT CHANGE UP (ref 3.8–10.5)

## 2017-10-09 RX ADMIN — Medication 1 APPLICATION(S): at 12:18

## 2017-10-09 RX ADMIN — Medication 1 TABLET(S): at 12:17

## 2017-10-09 RX ADMIN — PREGABALIN 500 MICROGRAM(S): 225 CAPSULE ORAL at 12:18

## 2017-10-09 RX ADMIN — Medication 1 MILLIGRAM(S): at 12:18

## 2017-10-09 NOTE — PROGRESS NOTE ADULT - SUBJECTIVE AND OBJECTIVE BOX
HPI:  Patient is a 66/M with unknown PMHx, BIB EMS for complaint of LLQ pain, however Patient is very confused, unable to answer questions even with . Used  services: [id number], Mr Cordoba. Majority of Hx obtained from ED provider note. patient is alert but not ortiented to time/place and person.Pt knows his name, but nothing else. Apparently Patient is homeless and was found on the street by EMS. Per triage nurse, pt denies nausea, vomiting.However, pt denied any abdominal pain during examination, and was more concerned about his BP. No knowledge of pt's underlying mentation. Patient's states that  he live with his brother in law in the [place], has h/o hemorrhagic stroke in the past, no h/o seizure, does not know his brother in law's last name, only 1st name, Ivan. does not know the number. (12 Sep 2017 22:05)      Patient is a 66y old  Male who presents with a chief complaint of LLQ pain (18 Sep 2017 17:23)      INTERVAL HPI/OVERNIGHT EVENTS:  Pt has no overnight complaints. Pt states he's feeling good and would like to be discharged.     T(C): 36.6 (10-09-17 @ 13:39), Max: 36.8 (10-09-17 @ 05:27)  HR: 72 (10-09-17 @ 13:39) (66 - 72)  BP: 132/71 (10-09-17 @ 13:39) (119/79 - 132/71)  RR: 16 (10-09-17 @ 13:39) (16 - 16)  SpO2: 98% (10-09-17 @ 13:39) (96% - 98%)  Wt(kg): --  I&O's Summary      REVIEW OF SYSTEMS: denies fever, chills, SOB, palpitations, chest pain, abdominal pain, nausea, vomitting, diarrhea, constipation, dizziness    MEDICATIONS  (STANDING):  cyanocobalamin 500 MICROGram(s) Oral daily  ergocalciferol 84715 Unit(s) Oral <User Schedule>  folic acid 1 milliGRAM(s) Oral daily  hydrocortisone 1% Cream 1 Application(s) Topical daily  multivitamin 1 Tablet(s) Oral daily    MEDICATIONS  (PRN):  guaiFENesin    Syrup 100 milliGRAM(s) Oral every 6 hours PRN Cough      PHYSICAL EXAM:  GENERAL: NAD, well-groomed, well-developed  HEAD:  Atraumatic, Normocephalic  NECK: Supple, No JVD  NERVOUS SYSTEM:  Alert & Oriented X3, Good concentration  CHEST/LUNG: Clear to auscultation bilaterally; No rales, rhonchi, wheezing, or rubs  HEART: Regular rate and rhythm; No murmurs, rubs, or gallops  ABDOMEN: Soft, Nontender, mild distension; Bowel sounds present  EXTREMITIES:  2+ Peripheral Pulses, No clubbing, cyanosis, or edema    LABS:                        15.8   9.9   )-----------( 192      ( 09 Oct 2017 06:51 )             46.6     10-09    140  |  105  |  16  ----------------------------<  98  3.9   |  28  |  1.02    Ca    9.4      09 Oct 2017 06:51          CAPILLARY BLOOD GLUCOSE

## 2017-10-09 NOTE — PROGRESS NOTE ADULT - PROBLEM SELECTOR PLAN 1
AAox2  U tox and blood EtOH levels negative  CT head negative for CVA  Social work planning for placement due to homelessness

## 2017-10-10 RX ADMIN — Medication 1 TABLET(S): at 11:54

## 2017-10-10 RX ADMIN — Medication 1 APPLICATION(S): at 11:54

## 2017-10-10 RX ADMIN — Medication 1 MILLIGRAM(S): at 11:54

## 2017-10-10 RX ADMIN — PREGABALIN 500 MICROGRAM(S): 225 CAPSULE ORAL at 11:54

## 2017-10-10 NOTE — PROGRESS NOTE ADULT - SUBJECTIVE AND OBJECTIVE BOX
HPI:  Patient is a 66/M with unknown PMHx, BIB EMS for complaint of LLQ pain, however Patient is very confused, unable to answer questions even with . Used  services: [id number], Mr Cordoba. Majority of Hx obtained from ED provider note. patient is alert but not ortiented to time/place and person.Pt knows his name, but nothing else. Apparently Patient is homeless and was found on the street by EMS. Per triage nurse, pt denies nausea, vomiting.However, pt denied any abdominal pain during examination, and was more concerned about his BP. No knowledge of pt's underlying mentation. Patient's states that  he live with his brother in law in the [place], has h/o hemorrhagic stroke in the past, no h/o seizure, does not know his brother in law's last name, only 1st name, Ivan. does not know the number. (12 Sep 2017 22:05)      Patient is a 66y old  Male who presents with a chief complaint of LLQ pain (18 Sep 2017 17:23)      INTERVAL HPI/OVERNIGHT EVENTS:  Pt has no overnight complaints. Pt states he's feeling good and waiting for discharge placement.     T(C): 36.6 (10-09-17 @ 13:39), Max: 36.8 (10-09-17 @ 05:27)  HR: 72 (10-09-17 @ 13:39) (66 - 72)  BP: 132/71 (10-09-17 @ 13:39) (119/79 - 132/71)  RR: 16 (10-09-17 @ 13:39) (16 - 16)  SpO2: 98% (10-09-17 @ 13:39) (96% - 98%)  Wt(kg): --  I&O's Summary      REVIEW OF SYSTEMS: denies fever, chills, SOB, palpitations, chest pain, abdominal pain, nausea, vomitting, diarrhea, constipation, dizziness    MEDICATIONS  (STANDING):  cyanocobalamin 500 MICROGram(s) Oral daily  ergocalciferol 97372 Unit(s) Oral <User Schedule>  folic acid 1 milliGRAM(s) Oral daily  hydrocortisone 1% Cream 1 Application(s) Topical daily  multivitamin 1 Tablet(s) Oral daily    MEDICATIONS  (PRN):  guaiFENesin    Syrup 100 milliGRAM(s) Oral every 6 hours PRN Cough      PHYSICAL EXAM:  GENERAL: NAD, well-groomed, well-developed  HEAD:  Atraumatic, Normocephalic  NECK: Supple, No JVD  NERVOUS SYSTEM:  Alert & Oriented X3, Good concentration  CHEST/LUNG: Clear to auscultation bilaterally; No rales, rhonchi, wheezing, or rubs  HEART: Regular rate and rhythm; No murmurs, rubs, or gallops  ABDOMEN: Soft, Nontender, mild distension; Bowel sounds present  EXTREMITIES:  2+ Peripheral Pulses, No clubbing, cyanosis, or edema    LABS:                        15.8   9.9   )-----------( 192      ( 09 Oct 2017 06:51 )             46.6     10-09    140  |  105  |  16  ----------------------------<  98  3.9   |  28  |  1.02    Ca    9.4      09 Oct 2017 06:51          CAPILLARY BLOOD GLUCOSE

## 2017-10-11 RX ADMIN — Medication 1 TABLET(S): at 12:11

## 2017-10-11 RX ADMIN — Medication 1 APPLICATION(S): at 12:11

## 2017-10-11 RX ADMIN — Medication 1 MILLIGRAM(S): at 12:11

## 2017-10-11 RX ADMIN — PREGABALIN 500 MICROGRAM(S): 225 CAPSULE ORAL at 12:11

## 2017-10-12 RX ADMIN — Medication 1 MILLIGRAM(S): at 12:09

## 2017-10-12 RX ADMIN — ERGOCALCIFEROL 50000 UNIT(S): 1.25 CAPSULE ORAL at 12:09

## 2017-10-12 RX ADMIN — Medication 1 APPLICATION(S): at 12:10

## 2017-10-12 RX ADMIN — Medication 1 TABLET(S): at 12:09

## 2017-10-12 RX ADMIN — PREGABALIN 500 MICROGRAM(S): 225 CAPSULE ORAL at 12:09

## 2017-10-12 NOTE — PROGRESS NOTE ADULT - PROBLEM SELECTOR PLAN 2
RESOLVED   s/p rocephin  UA was positive in ED; Cultures revealed E. facealis  ID Dr. Fitzgerald was consulted

## 2017-10-12 NOTE — PROGRESS NOTE ADULT - SUBJECTIVE AND OBJECTIVE BOX
Patient is a 66y old  Male who presents with a chief complaint of LLQ pain (18 Sep 2017 17:23)      INTERVAL HPI/OVERNIGHT EVENTS:  Pt states no overnight events, pt feels good today and is wondering when he can leave.     T(C): 36.9 (10-12-17 @ 05:36), Max: 36.9 (10-12-17 @ 05:36)  HR: 66 (10-12-17 @ 05:36) (66 - 66)  BP: 145/77 (10-12-17 @ 05:36) (119/69 - 145/77)  RR: 16 (10-12-17 @ 05:36) (16 - 16)  SpO2: 99% (10-12-17 @ 05:36) (98% - 100%)      REVIEW OF SYSTEMS: denies fever, chills, SOB, palpitations, chest pain, abdominal pain, nausea, vomitting, diarrhea, constipation, dizziness    MEDICATIONS  (STANDING):  cyanocobalamin 500 MICROGram(s) Oral daily  ergocalciferol 40093 Unit(s) Oral <User Schedule>  folic acid 1 milliGRAM(s) Oral daily  hydrocortisone 1% Cream 1 Application(s) Topical daily  multivitamin 1 Tablet(s) Oral daily    MEDICATIONS  (PRN):  guaiFENesin    Syrup 100 milliGRAM(s) Oral every 6 hours PRN Cough      PHYSICAL EXAM:  GENERAL: NAD, well-groomed, well-developed  HEAD:  Atraumatic, Normocephalic  NECK: Supple, No JVD  NERVOUS SYSTEM:  Alert & Oriented X3, Good concentration  CHEST/LUNG: Clear to auscultation bilaterally; No rales, rhonchi, wheezing, or rubs  HEART: Regular rate and rhythm; No murmurs, rubs, or gallops  ABDOMEN: Soft, Nontender, Nondistended; Bowel sounds present  EXTREMITIES:  2+ Peripheral Pulses, No clubbing, cyanosis, or edema

## 2017-10-13 VITALS
RESPIRATION RATE: 16 BRPM | DIASTOLIC BLOOD PRESSURE: 75 MMHG | OXYGEN SATURATION: 96 % | SYSTOLIC BLOOD PRESSURE: 140 MMHG | TEMPERATURE: 98 F | HEART RATE: 64 BPM

## 2017-10-13 PROCEDURE — 87389 HIV-1 AG W/HIV-1&-2 AB AG IA: CPT

## 2017-10-13 PROCEDURE — 87186 SC STD MICRODIL/AGAR DIL: CPT

## 2017-10-13 PROCEDURE — 82607 VITAMIN B-12: CPT

## 2017-10-13 PROCEDURE — 85730 THROMBOPLASTIN TIME PARTIAL: CPT

## 2017-10-13 PROCEDURE — 84484 ASSAY OF TROPONIN QUANT: CPT

## 2017-10-13 PROCEDURE — 84300 ASSAY OF URINE SODIUM: CPT

## 2017-10-13 PROCEDURE — 71045 X-RAY EXAM CHEST 1 VIEW: CPT

## 2017-10-13 PROCEDURE — 82306 VITAMIN D 25 HYDROXY: CPT

## 2017-10-13 PROCEDURE — 82009 KETONE BODYS QUAL: CPT

## 2017-10-13 PROCEDURE — 86592 SYPHILIS TEST NON-TREP QUAL: CPT

## 2017-10-13 PROCEDURE — 80074 ACUTE HEPATITIS PANEL: CPT

## 2017-10-13 PROCEDURE — 83036 HEMOGLOBIN GLYCOSYLATED A1C: CPT

## 2017-10-13 PROCEDURE — 83735 ASSAY OF MAGNESIUM: CPT

## 2017-10-13 PROCEDURE — 93005 ELECTROCARDIOGRAM TRACING: CPT

## 2017-10-13 PROCEDURE — 83605 ASSAY OF LACTIC ACID: CPT

## 2017-10-13 PROCEDURE — 99285 EMERGENCY DEPT VISIT HI MDM: CPT | Mod: 25

## 2017-10-13 PROCEDURE — 81001 URINALYSIS AUTO W/SCOPE: CPT

## 2017-10-13 PROCEDURE — 80061 LIPID PANEL: CPT

## 2017-10-13 PROCEDURE — 70450 CT HEAD/BRAIN W/O DYE: CPT

## 2017-10-13 PROCEDURE — 82570 ASSAY OF URINE CREATININE: CPT

## 2017-10-13 PROCEDURE — 84443 ASSAY THYROID STIM HORMONE: CPT

## 2017-10-13 PROCEDURE — 87040 BLOOD CULTURE FOR BACTERIA: CPT

## 2017-10-13 PROCEDURE — 87086 URINE CULTURE/COLONY COUNT: CPT

## 2017-10-13 PROCEDURE — 85027 COMPLETE CBC AUTOMATED: CPT

## 2017-10-13 PROCEDURE — 82248 BILIRUBIN DIRECT: CPT

## 2017-10-13 PROCEDURE — 82550 ASSAY OF CK (CPK): CPT

## 2017-10-13 PROCEDURE — 80048 BASIC METABOLIC PNL TOTAL CA: CPT

## 2017-10-13 PROCEDURE — 86780 TREPONEMA PALLIDUM: CPT

## 2017-10-13 PROCEDURE — 83690 ASSAY OF LIPASE: CPT

## 2017-10-13 PROCEDURE — 80053 COMPREHEN METABOLIC PANEL: CPT

## 2017-10-13 PROCEDURE — 84133 ASSAY OF URINE POTASSIUM: CPT

## 2017-10-13 PROCEDURE — 82436 ASSAY OF URINE CHLORIDE: CPT

## 2017-10-13 PROCEDURE — 83935 ASSAY OF URINE OSMOLALITY: CPT

## 2017-10-13 PROCEDURE — 84100 ASSAY OF PHOSPHORUS: CPT

## 2017-10-13 PROCEDURE — 80307 DRUG TEST PRSMV CHEM ANLYZR: CPT

## 2017-10-13 PROCEDURE — 74176 CT ABD & PELVIS W/O CONTRAST: CPT

## 2017-10-13 PROCEDURE — 86593 SYPHILIS TEST NON-TREP QUANT: CPT

## 2017-10-13 PROCEDURE — 82746 ASSAY OF FOLIC ACID SERUM: CPT

## 2017-10-13 PROCEDURE — 85610 PROTHROMBIN TIME: CPT

## 2017-10-13 PROCEDURE — 82553 CREATINE MB FRACTION: CPT

## 2017-10-13 PROCEDURE — 84156 ASSAY OF PROTEIN URINE: CPT

## 2017-10-13 RX ORDER — PREGABALIN 225 MG/1
1 CAPSULE ORAL
Qty: 30 | Refills: 0 | OUTPATIENT
Start: 2017-10-13 | End: 2017-11-12

## 2017-10-13 RX ORDER — HYDROCORTISONE 1 %
1 OINTMENT (GRAM) TOPICAL
Qty: 1 | Refills: 0 | OUTPATIENT
Start: 2017-10-13 | End: 2017-11-12

## 2017-10-13 RX ORDER — FOLIC ACID 0.8 MG
1 TABLET ORAL
Qty: 30 | Refills: 0 | OUTPATIENT
Start: 2017-10-13 | End: 2017-11-12

## 2017-10-13 RX ADMIN — Medication 1 TABLET(S): at 11:31

## 2017-10-13 RX ADMIN — Medication 1 MILLIGRAM(S): at 11:31

## 2017-10-13 RX ADMIN — PREGABALIN 500 MICROGRAM(S): 225 CAPSULE ORAL at 11:31

## 2017-10-13 RX ADMIN — Medication 1 APPLICATION(S): at 11:31

## 2017-10-13 NOTE — PROGRESS NOTE ADULT - PROBLEM SELECTOR PROBLEM 4
Prophylactic measure

## 2017-10-13 NOTE — PROGRESS NOTE ADULT - PROBLEM SELECTOR PROBLEM 2
UTI (urinary tract infection)
Mental status alteration
UTI (urinary tract infection)

## 2017-10-13 NOTE — PROGRESS NOTE ADULT - PROBLEM SELECTOR PLAN 3
Resolved.  BUN/Cr 25/1.54 - 16.23 at ED  - Repeat a.m. BMP 15/0.9; VICENTE resolved w/ IV rehydration  - Urine electrolytes FeNA - 0.4, prerenal
Resolved; likely pre-renal  BUN:Cr 25:1.54 in ED  Continue to monitor BMP
Resolved.  BUN/Cr 25/1.54 - 16.23 at ED  - Repeat a.m. BMP 15/0.9; VICENTE resolved w/ IV rehydration  - Urine electrolytes FeNA - 0.4, prerenal
Resolved.  BUN/Cr 25/1.54 - 16.23 at ED  - Repeat a.m. BMP 15/0.9; VICENTE resolved w/ IV rehydration  - Urine electrolytes FeNA - 0.4, prerenal
Resolved; likely pre-renal  BUN:Cr 25:1.54 in ED  Continue to monitor BMP
Resolved; likely pre-renal  BUN:Cr 25:1.54 in ED  Continuing to monitor BMP
BUN/Cr 25/1.54 - 16.23 at ED  - Repeat a.m. BMP 15/0.9; VICENTE resolved w/ IV rehydration  - Urine electrolytes FeNA - 0.4, prerenal
Resolved.  BUN/Cr 25/1.54 - 16.23 at ED  - Repeat a.m. BMP 15/0.9; VICENTE resolved w/ IV rehydration  - Urine electrolytes FeNA - 0.4, prerenal
Resolved; likely pre-renal  BUN:Cr 25:1.54 in ED  Continue to monitor BMP
Resolved; likely pre-renal  BUN:Cr 25:1.54 in ED  Continuing to monitor BMP
Resolved; likely pre-renal  BUN:Cr 25:1.54 in ED  Continuing to monitor BMP

## 2017-10-13 NOTE — PROGRESS NOTE ADULT - PROBLEM SELECTOR PROBLEM 3
VICENTE (acute kidney injury)

## 2017-10-13 NOTE — PROGRESS NOTE ADULT - PROBLEM SELECTOR PROBLEM 1
Mental status alteration
UTI (urinary tract infection)
Mental status alteration
Mental status alteration

## 2017-10-13 NOTE — PROGRESS NOTE ADULT - ASSESSMENT
66 M poor historian with unknown PMHx BIB EMS for complaint of LLQ pain - ED workup included CT showing  shunt coursing over LLQ, cirrhotic liver, and UA w/ +ve LE and 6-10 WBC - admitted patient for workup of abdominal pain and encephalopathy - Patient medically clear for discharge - slight unsteadiness with ambulating noted w/ PT evaluation pending psychiatric clearence for medical capacity - patient admitted to being homeless
66 M poor historian with unknown PMHx BIB EMS for complaint of LLQ pain - ED workup included CT showing  shunt coursing over LLQ, cirrhotic liver, and UA w/ +ve LE and 6-10 WBC - admitted patient for workup of abdominal pain and encephalopathy - Patient medically clear for discharge - slight unsteadiness with ambulating noted w/ PT evaluation pending psychiatric clearence for medical capacity - patient admitted to being homeless, pending placement
66 M poor historian with unknown PMHx BIB EMS for complaint of LLQ pain - ED workup included CT showing  shunt coursing over LLQ, cirrhotic liver, and UA w/ +ve LE and 6-10 WBC - admitted patient for workup of abdominal pain and encephalopathy
66 M poor historian with unknown PMHx BIB EMS for complaint of LLQ pain - ED workup included CT showing  shunt coursing over LLQ, cirrhotic liver, and UA w/ +ve LE and 6-10 WBC - admitted patient for workup of abdominal pain and encephalopathy - Patient medically clear for discharge
66 M poor historian with unknown PMHx BIB EMS for complaint of LLQ pain - ED workup included CT showing  shunt coursing over LLQ, cirrhotic liver, and UA w/ +ve LE and 6-10 WBC - admitted patient for workup of abdominal pain and encephalopathy - Patient medically clear for discharge - slight unsteadiness with ambulating noted w/ PT evaluation pending psychiatric clearence for medical capacity - patient admitted to being homeless
66 M poor historian with unknown PMHx BIB EMS for complaint of LLQ pain - ED workup included CT showing  shunt coursing over LLQ, cirrhotic liver, and UA w/ +ve LE and 6-10 WBC - admitted patient for workup of abdominal pain and encephalopathy - Patient medically clear for discharge - slight unsteadiness with ambulating noted w/ PT evaluation pending psychiatric clearence for medical capacity - patient admitted to being homeless, pending placement
advanced dementia,  hydrocephalus  s/p  shunt.  came with abd pain, cirrhosis.  is for placement.
dementia  hydrocephalus/  shunt.  doing ok  needs for placement.
for placement   rpr 1;1    h/o  shunt.  placement  .
out of bed in chair with assistance.   rpr 1;1  mindy OLIVER/nenita planning
out of bed in chair.  awaiting for guardianship.
rpr 1;1  i think nothing to be done    supplement b12, folic acid and vit d
66 M poor historian with unknown PMHx BIB EMS for complaint of LLQ pain - ED workup included CT showing  shunt coursing over LLQ, cirrhotic liver, and UA w/ +ve LE and 6-10 WBC - admitted patient for workup of abdominal pain and encephalopathy - Patient medically clear for discharge - slight unsteadiness with ambulating noted w/ PT evaluation pending psychiatric clearence for medical capacity - patient admitted to being homeless
66 M poor historian with unknown PMHx BIB EMS for complaint of LLQ pain - ED workup included CT showing  shunt coursing over LLQ, cirrhotic liver, and UA w/ +ve LE and 6-10 WBC - admitted patient for workup of abdominal pain and encephalopathy - Patient medically clear for discharge - slight unsteadiness with ambulating noted w/ PT evaluation pending psychiatric clearence for medical capacity - patient admitted to being homeless, pending placement

## 2017-10-13 NOTE — PROGRESS NOTE ADULT - PROVIDER SPECIALTY LIST ADULT
Internal Medicine

## 2017-10-13 NOTE — PROGRESS NOTE ADULT - SUBJECTIVE AND OBJECTIVE BOX
HPI:  Patient is a 66/M with unknown PMHx, BIB EMS for complaint of LLQ pain, however Patient is very confused, unable to answer questions even with . Used  services: [id number], Mr Cordoba. Majority of Hx obtained from ED provider note. patient is alert but not ortiented to time/place and person.Pt knows his name, but nothing else. Apparently Patient is homeless and was found on the street by EMS. Per triage nurse, pt denies nausea, vomiting.However, pt denied any abdominal pain during examination, and was more concerned about his BP. No knowledge of pt's underlying mentation. Patient's states that  he live with his brother in law in the [place], has h/o hemorrhagic stroke in the past, no h/o seizure, does not know his brother in law's last name, only 1st name, Ivan. does not know the number. (12 Sep 2017 22:05)      Patient is a 66y old  Male who presents with a chief complaint of LLQ pain (18 Sep 2017 17:23)      INTERVAL HPI/OVERNIGHT EVENTS:  Pt states no overnight events, he is feeling good.     T(C): 36.7 (10-13-17 @ 05:01), Max: 36.7 (10-12-17 @ 14:32)  HR: 60 (10-13-17 @ 05:01) (60 - 64)  BP: 133/68 (10-13-17 @ 05:01) (133/68 - 145/76)  RR: 16 (10-13-17 @ 05:01) (16 - 16)  SpO2: 98% (10-13-17 @ 05:01) (98% - 99%)      REVIEW OF SYSTEMS: denies fever, chills, SOB, palpitations, chest pain, abdominal pain, nausea, vomitting, diarrhea, constipation, dizziness    MEDICATIONS  (STANDING):  cyanocobalamin 500 MICROGram(s) Oral daily  ergocalciferol 60286 Unit(s) Oral <User Schedule>  folic acid 1 milliGRAM(s) Oral daily  hydrocortisone 1% Cream 1 Application(s) Topical daily  multivitamin 1 Tablet(s) Oral daily    MEDICATIONS  (PRN):  guaiFENesin    Syrup 100 milliGRAM(s) Oral every 6 hours PRN Cough      PHYSICAL EXAM:  GENERAL: NAD, well-groomed, well-developed  HEAD:  Atraumatic, Normocephalic  EYES: EOMI, conjunctiva and sclera clear  NECK: Supple, No JVD  NERVOUS SYSTEM:  Alert & Oriented X2, Good concentration  CHEST/LUNG: Clear to auscultation bilaterally; No rales, rhonchi, wheezing, or rubs  HEART: Regular rate and rhythm; No murmurs, rubs, or gallops  ABDOMEN: Soft, Nontender, normally distended; Bowel sounds present  EXTREMITIES:  2+ Peripheral Pulses, No clubbing, cyanosis, or edema    LABS:              CAPILLARY BLOOD GLUCOSE

## 2017-10-13 NOTE — PROGRESS NOTE ADULT - PROBLEM SELECTOR PLAN 4
IMPROVE score 2 - DVT PPx w/ HSQ  No indication for GI stress ulcer PPx; patient tolerating diet
Improve VTE = 2  Heparin for DVT chemoprophylaxis
IMPROVE score 2 - DVT PPx w/ HSQ  No indication for GI stress ulcer PPx; patient tolerating diet
IMPROVE score 2 - DVT PPx w/ HSQ  No indication for GI stress ulcer PPx; patient tolerating diet
Improve VTE = 2  Heparin for DVT chemoprophylaxis
IMPROVE score 2 - DVT PPx w/ HSQ  No indication for GI stress ulcer PPx; patient tolerating diet
Improve VTE = 2  Heparin for DVT chemoprophylaxis

## 2017-10-16 DIAGNOSIS — A53.9 SYPHILIS, UNSPECIFIED: ICD-10-CM

## 2017-10-16 DIAGNOSIS — K74.60 UNSPECIFIED CIRRHOSIS OF LIVER: ICD-10-CM

## 2017-10-16 DIAGNOSIS — N39.0 URINARY TRACT INFECTION, SITE NOT SPECIFIED: ICD-10-CM

## 2017-10-16 DIAGNOSIS — Z59.0 HOMELESSNESS: ICD-10-CM

## 2017-10-16 DIAGNOSIS — B95.2 ENTEROCOCCUS AS THE CAUSE OF DISEASES CLASSIFIED ELSEWHERE: ICD-10-CM

## 2017-10-16 DIAGNOSIS — Z86.73 PERSONAL HISTORY OF TRANSIENT ISCHEMIC ATTACK (TIA), AND CEREBRAL INFARCTION WITHOUT RESIDUAL DEFICITS: ICD-10-CM

## 2017-10-16 DIAGNOSIS — N17.9 ACUTE KIDNEY FAILURE, UNSPECIFIED: ICD-10-CM

## 2017-10-16 DIAGNOSIS — F02.80 DEMENTIA IN OTHER DISEASES CLASSIFIED ELSEWHERE, UNSPECIFIED SEVERITY, WITHOUT BEHAVIORAL DISTURBANCE, PSYCHOTIC DISTURBANCE, MOOD DISTURBANCE, AND ANXIETY: ICD-10-CM

## 2017-10-16 DIAGNOSIS — G93.40 ENCEPHALOPATHY, UNSPECIFIED: ICD-10-CM

## 2017-10-16 DIAGNOSIS — Z98.2 PRESENCE OF CEREBROSPINAL FLUID DRAINAGE DEVICE: ICD-10-CM

## 2017-10-16 SDOH — ECONOMIC STABILITY - HOUSING INSECURITY: HOMELESSNESS: Z59.0

## 2022-09-16 NOTE — H&P ADULT - MUSCULOSKELETAL
Reason for Call:  Other     Detailed comments: Patient is calling to update nurse that he is on a new medication called Alendronate, and may affect his INRs    Phone Number Patient can be reached at: Home number on file [phone] (home)    Best Time: As soon as possible    Can we leave a detailed message on this number? YES    Call taken on 9/16/2022 at 9:35 AM by Idania Quick     detailed exam no joint erythema/ROM intact/no joint swelling

## 2023-09-19 NOTE — BEHAVIORAL HEALTH ASSESSMENT NOTE - MOOD
Health Maintenance Due   Topic Date Due   • COVID-19 Vaccine (1) Never done   • Influenza Vaccine (1) 09/01/2023       Patient is due for topics as listed above but is not proceeding with Immunization(s) COVID-19 and Influenza at this time. Education provided for Immunization(s) Influenza. Per CDC recommendations covid vaccines are on hold.      Normal

## 2024-11-04 NOTE — ED PROVIDER NOTE - CARE PLAN
Regular Diet - No restrictions Principal Discharge DX:	Mental status alteration  Secondary Diagnosis:	Renal insufficiency

## 2024-12-25 NOTE — CONSULT NOTE ADULT - NEUROLOGICAL DETAILS
0
responds to pain/cranial nerves intact/responds to verbal commands/no spontaneous movement/sensation intact/deep reflexes intact